# Patient Record
Sex: FEMALE | Race: WHITE | NOT HISPANIC OR LATINO | URBAN - METROPOLITAN AREA
[De-identification: names, ages, dates, MRNs, and addresses within clinical notes are randomized per-mention and may not be internally consistent; named-entity substitution may affect disease eponyms.]

---

## 2019-07-04 ENCOUNTER — HOSPITAL ENCOUNTER (OUTPATIENT)
Dept: URGENT CARE | Facility: CLINIC | Age: 64
Discharge: HOME | End: 2019-07-04

## 2024-09-16 ENCOUNTER — TELEPHONE (OUTPATIENT)
Dept: ORTHOPEDIC SURGERY | Facility: CLINIC | Age: 69
End: 2024-09-16
Payer: MEDICARE

## 2024-09-18 ENCOUNTER — OFFICE VISIT (OUTPATIENT)
Dept: ORTHOPEDIC SURGERY | Facility: CLINIC | Age: 69
End: 2024-09-18
Payer: MEDICARE

## 2024-09-18 VITALS
BODY MASS INDEX: 19.76 KG/M2 | DIASTOLIC BLOOD PRESSURE: 78 MMHG | HEIGHT: 59 IN | HEART RATE: 93 BPM | SYSTOLIC BLOOD PRESSURE: 148 MMHG | OXYGEN SATURATION: 93 % | WEIGHT: 98 LBS

## 2024-09-18 DIAGNOSIS — S52.501A CLOSED FRACTURE OF DISTAL END OF RIGHT RADIUS, UNSPECIFIED FRACTURE MORPHOLOGY, INITIAL ENCOUNTER: ICD-10-CM

## 2024-09-18 DIAGNOSIS — M25.531 RIGHT WRIST PAIN: Primary | ICD-10-CM

## 2024-09-25 ENCOUNTER — OFFICE VISIT (OUTPATIENT)
Dept: ORTHOPEDIC SURGERY | Facility: CLINIC | Age: 69
End: 2024-09-25
Payer: MEDICARE

## 2024-09-25 VITALS
SYSTOLIC BLOOD PRESSURE: 146 MMHG | WEIGHT: 98 LBS | HEART RATE: 82 BPM | HEIGHT: 59 IN | OXYGEN SATURATION: 95 % | BODY MASS INDEX: 19.76 KG/M2 | DIASTOLIC BLOOD PRESSURE: 77 MMHG

## 2024-09-25 DIAGNOSIS — M25.531 RIGHT WRIST PAIN: Primary | ICD-10-CM

## 2024-09-25 DIAGNOSIS — S52.501A CLOSED FRACTURE OF DISTAL END OF RIGHT RADIUS, UNSPECIFIED FRACTURE MORPHOLOGY, INITIAL ENCOUNTER: ICD-10-CM

## 2024-10-02 ENCOUNTER — OFFICE VISIT (OUTPATIENT)
Dept: ORTHOPEDIC SURGERY | Facility: CLINIC | Age: 69
End: 2024-10-02
Payer: MEDICARE

## 2024-10-02 VITALS
DIASTOLIC BLOOD PRESSURE: 81 MMHG | OXYGEN SATURATION: 96 % | HEIGHT: 59 IN | BODY MASS INDEX: 19.76 KG/M2 | WEIGHT: 98 LBS | HEART RATE: 74 BPM | SYSTOLIC BLOOD PRESSURE: 159 MMHG

## 2024-10-02 DIAGNOSIS — M25.531 RIGHT WRIST PAIN: Primary | ICD-10-CM

## 2024-10-02 DIAGNOSIS — S52.501A CLOSED FRACTURE OF DISTAL END OF RIGHT RADIUS, UNSPECIFIED FRACTURE MORPHOLOGY, INITIAL ENCOUNTER: ICD-10-CM

## 2024-10-02 NOTE — PROGRESS NOTES
"Chief Complaint  Follow-up and Pain of the Right Wrist    Subjective          Nicole Abraham presents to [facility] for a follow up for her right wrist.     History of Present Illness    The patient presents here today for a follow up for her right wrist. She has been treating her right wrist fracture conservatively in a short arm cast which was applied on 09/18/24. She presents today for her two week appointment. To review,  she fell in her yard and landed on her right wrist on 09/15/24. She denies any new injury or falls since her last visit.     No Known Allergies     Social History     Socioeconomic History    Marital status:    Tobacco Use    Smoking status: Never     Passive exposure: Never    Smokeless tobacco: Never   Vaping Use    Vaping status: Never Used   Substance and Sexual Activity    Alcohol use: Not Currently        I reviewed the patient's chief complaint, history of present illness, review of systems, past medical history, surgical history, family history, social history, medications, and allergy list.     REVIEW OF SYSTEMS    Constitutional: Denies fevers, chills, weight loss  Cardiovascular: Denies chest pain, shortness of breath  Skin: Denies rashes, acute skin changes  Neurologic: Denies headache, loss of consciousness  MSK: right wrist pain       Objective   Vital Signs:   /81   Pulse 74   Ht 149.9 cm (59\")   Wt 44.5 kg (98 lb)   SpO2 96%   BMI 19.79 kg/m²     Body mass index is 19.79 kg/m².    Physical Exam    General: Alert. No acute distress.   Right upper extremity:  short arm cast is clean, dry and intact, mild swelling and resolving bruising, no wounds or abrasions, able to wiggle her fingers, full elbow range of motion, neurovascularly intact, sensation intact to the medial , radial and ulnar nerve      Procedures    Imaging Results (Most Recent)       Procedure Component Value Units Date/Time    XR Wrist 2 View Right [[id number]] " Resulted: 10/02/24 0950     Updated: 10/02/24 0950    Narrative:      Indications: Follow-up right distal radius fracture    Views: AP and lateral right wrist    Findings: Stable appearing distal radius fracture with mild dorsal   angulation.  Cast material in place.  Degenerative changes in the hand and   wrist are stable.    Comparative Data: Comparative data found and reviewed today                     Assessment and Plan        XR Wrist 2 View Right    Result Date: 10/2/2024  Narrative: Indications: Follow-up right distal radius fracture Views: AP and lateral right wrist Findings: Stable appearing distal radius fracture with mild dorsal angulation.  Cast material in place.  Degenerative changes in the hand and wrist are stable. Comparative Data: Comparative data found and reviewed today    XR Wrist 2 View Right    Result Date: 9/25/2024  Narrative: Indications: Follow-up right distal radius fracture Views: AP and lateral right wrist Findings: Overall stable appearing fracture of the distal radius with mild radial and dorsal translation and angulation.  Cast material in place. Comparative Data: Comparative data found and reviewed today    XR Wrist 2 View Right    Result Date: 9/18/2024  Narrative: Indications: Follow-up right distal radius fracture Views: AP and lateral right wrist Findings: Right distal radius fracture again seen.  Intra-articular extension and mild dorsal angulation noted.  Radiocarpal joint remains reduced. Comparative Data: Comparative data found and reviewed today    XR Wrist 3+ View Right    Result Date: 9/15/2024  Narrative: XR HAND 3+ VW RIGHT, XR WRIST 3+ VW RIGHT Date of Exam: 9/15/2024 4:19 PM EDT Indication: Fall; right hand and wrist pain Comparison: None available. Findings: There is an impacted fracture involving the distal radius. There is mild dorsal angulation of the distal fracture fragment. The articulations of the wrist remain intact without dislocation. There is no  additional displaced fracture throughout the remaining bones of the right hand. There is no dislocation. Mild to moderate changes of osteoarthritis are noted, most pronounced at the first carpal metacarpal joint.     Impression: Impression: 1.Transverse impacted fracture involving the distal right radius. There is dorsal angulation of the distal fracture fragment. The articulations of the wrist remain intact without dislocation. Electronically Signed: Porfirio Malave MD  9/15/2024 4:43 PM EDT  Workstation ID: [id number]    XR Hand 3+ View Right    Result Date: 9/15/2024  Narrative: XR HAND 3+ VW RIGHT, XR WRIST 3+ VW RIGHT Date of Exam: 9/15/2024 4:19 PM EDT Indication: Fall; right hand and wrist pain Comparison: None available. Findings: There is an impacted fracture involving the distal radius. There is mild dorsal angulation of the distal fracture fragment. The articulations of the wrist remain intact without dislocation. There is no additional displaced fracture throughout the remaining bones of the right hand. There is no dislocation. Mild to moderate changes of osteoarthritis are noted, most pronounced at the first carpal metacarpal joint.     Impression: Impression: 1.Transverse impacted fracture involving the distal right radius. There is dorsal angulation of the distal fracture fragment. The articulations of the wrist remain intact without dislocation. Electronically Signed: Porfirio Malave MD  9/15/2024 4:43 PM EDT  Workstation ID: [id number]      Diagnoses and all orders for this visit:    1. Right wrist pain (Primary)  -     XR Wrist 2 View Right    2. Closed fracture of distal end of right radius, unspecified fracture morphology, initial encounter      The patient presents here today for a follow up for her right wrist. X-rays were obtained in the office today and these were reviewed today.     She will continue the short arm cast. Advised she will be in the cast for six weeks total. She will continue to  elevate her wrist for swelling.     Will obtain X-Rays of right wrist in the cast at next visit.     Call or return if worsening symptoms.    Scribed for Roderick Cummings MD by Nahomi Robles  10/02/2024   08:35 EDT         Follow Up       2 weeks     Patient was given instructions and counseling regarding her condition or for health maintenance advice. Please see specific information pulled into the AVS if appropriate.       I have personally performed the services described in this document as scribed by the above individual and it is both accurate and complete. Roderick Cummings MD 10/02/24 11:37 EDT

## 2024-10-16 ENCOUNTER — OFFICE VISIT (OUTPATIENT)
Dept: ORTHOPEDIC SURGERY | Facility: CLINIC | Age: 69
End: 2024-10-16
Payer: MEDICARE

## 2024-10-16 VITALS
WEIGHT: 98 LBS | BODY MASS INDEX: 19.76 KG/M2 | HEIGHT: 59 IN | OXYGEN SATURATION: 96 % | HEART RATE: 89 BPM | SYSTOLIC BLOOD PRESSURE: 146 MMHG | DIASTOLIC BLOOD PRESSURE: 85 MMHG

## 2024-10-16 DIAGNOSIS — S52.501D CLOSED FRACTURE OF DISTAL END OF RIGHT RADIUS WITH ROUTINE HEALING, UNSPECIFIED FRACTURE MORPHOLOGY, SUBSEQUENT ENCOUNTER: ICD-10-CM

## 2024-10-16 DIAGNOSIS — M25.531 RIGHT WRIST PAIN: Primary | ICD-10-CM

## 2024-10-16 NOTE — PROGRESS NOTES
"Chief Complaint  Pain and Follow-up of the Right Wrist    Subjective          Nicole Abraham presents to [facility] for a follow up for her right wrist.     History of Present Illness    The patient presents here today for a follow up for her right wrist. She has been treating her right wrist fracture conservatively in a short arm cast which was applied on 09/18/24. She reports no new injury or falls. To review,  she fell in her yard and landed on her right wrist on 09/15/24. She denies any new injury or falls since her last visit.     No Known Allergies     Social History     Socioeconomic History    Marital status:    Tobacco Use    Smoking status: Never     Passive exposure: Never    Smokeless tobacco: Never   Vaping Use    Vaping status: Never Used   Substance and Sexual Activity    Alcohol use: Not Currently        I reviewed the patient's chief complaint, history of present illness, review of systems, past medical history, surgical history, family history, social history, medications, and allergy list.     REVIEW OF SYSTEMS    Constitutional: Denies fevers, chills, weight loss  Cardiovascular: Denies chest pain, shortness of breath  Skin: Denies rashes, acute skin changes  Neurologic: Denies headache, loss of consciousness  MSK: right wrist pain       Objective   Vital Signs:   /85   Pulse 89   Ht 149.9 cm (59\")   Wt 44.5 kg (98 lb)   SpO2 96%   BMI 19.79 kg/m²     Body mass index is 19.79 kg/m².    Physical Exam    General: Alert. No acute distress.   Right upper extremity: short arm cast is clean, dry and intact, mild swelling to her fingers, no wounds or abrasions, resolving bruising, able to wiggle her fingers, neurovascularly intact     Orthopedic Injury Treatment    Date/Time: 10/16/2024 9:15 AM    Performed by: Teresa Morillo MA  Authorized by: Roderick Cummings MD  Injury location: wrist  Location details: right wrist  Pre-procedure neurovascular " assessment: neurovascularly intact    Anesthesia:  Local anesthesia used: no    Sedation:  Patient sedated: no    Immobilization: cast  Supplies used: cotton padding (FIBERGLASS)  Post-procedure neurovascular assessment: post-procedure neurovascularly intact  Patient tolerance: patient tolerated the procedure well with no immediate complications  Comments: Patient was placed in fiberglass cast today.  The patient tolerated the procedure without any complications.          Imaging Results (Most Recent)       Procedure Component Value Units Date/Time    XR Wrist 2 View Right [[id number]] Resulted: 10/16/24 0927     Updated: 10/16/24 0927    Narrative:      Indications: Follow-up right distal radius fracture    Views: AP and lateral right wrist    Findings: Extra-articular fracture of the distal radius remains stable   with mild radial and dorsal displacement.  Arthritic changes in the hand   are stable.  Cast material in place.    Comparative Data: Comparative data found and reviewed today                     Assessment and Plan        XR Wrist 2 View Right    Result Date: 10/16/2024  Narrative: Indications: Follow-up right distal radius fracture Views: AP and lateral right wrist Findings: Extra-articular fracture of the distal radius remains stable with mild radial and dorsal displacement.  Arthritic changes in the hand are stable.  Cast material in place. Comparative Data: Comparative data found and reviewed today    XR Wrist 2 View Right    Result Date: 10/2/2024  Narrative: Indications: Follow-up right distal radius fracture Views: AP and lateral right wrist Findings: Stable appearing distal radius fracture with mild dorsal angulation.  Cast material in place.  Degenerative changes in the hand and wrist are stable. Comparative Data: Comparative data found and reviewed today    XR Wrist 2 View Right    Result Date: 9/25/2024  Narrative: Indications: Follow-up right distal radius fracture Views: AP and lateral right  wrist Findings: Overall stable appearing fracture of the distal radius with mild radial and dorsal translation and angulation.  Cast material in place. Comparative Data: Comparative data found and reviewed today    XR Wrist 2 View Right    Result Date: 9/18/2024  Narrative: Indications: Follow-up right distal radius fracture Views: AP and lateral right wrist Findings: Right distal radius fracture again seen.  Intra-articular extension and mild dorsal angulation noted.  Radiocarpal joint remains reduced. Comparative Data: Comparative data found and reviewed today      Diagnoses and all orders for this visit:    1. Right wrist pain (Primary)  -     XR Wrist 2 View Right    2. Closed fracture of distal end of right radius with routine healing, unspecified fracture morphology, subsequent encounter    Other orders  -     Orthopedic Injury Treatment      The patient presents here today for a follow up for her right wrist. X-rays were obtained in the office today and these were reviewed today.     Her short arm cast was removed and reapplied today due to her cast being loose. She will continue the short arm cast for another two weeks.     Advised patient to continue to elevate to help with swelling and to work on range of motion to her fingers. She will continue over the counter medications for pain control.     Will obtain X-Rays of right wrist out of the cast at next visit.     Call or return if worsening symptoms.    Scribed for Roderick Cummings MD by Nahomi Robles  10/16/2024   09:10 EDT         Follow Up       return in 2 weeks for follow up fracture care/ management.       Patient was given instructions and counseling regarding her condition or for health maintenance advice. Please see specific information pulled into the AVS if appropriate.         I have personally performed the services described in this document as scribed by the above individual and it is both accurate and complete. Roderick Cummings MD 10/16/24 10:17  EDT    Answers submitted by the patient for this visit:  Other (Submitted on 10/14/2024)  Please describe your symptoms.: Follow up on broken right wrist  Have you had these symptoms before?: Yes  How long have you been having these symptoms?: Greater than 2 weeks  Please list any medications you are currently taking for this condition.: Tylenol  Please describe any probable cause for these symptoms. : Broken right wrist  Primary Reason for Visit (Submitted on 10/14/2024)  What is the primary reason for your visit?: Problem Not Listed

## 2024-10-30 ENCOUNTER — OFFICE VISIT (OUTPATIENT)
Dept: ORTHOPEDIC SURGERY | Facility: CLINIC | Age: 69
End: 2024-10-30
Payer: MEDICARE

## 2024-10-30 VITALS
OXYGEN SATURATION: 97 % | HEIGHT: 59 IN | BODY MASS INDEX: 19.76 KG/M2 | DIASTOLIC BLOOD PRESSURE: 75 MMHG | HEART RATE: 92 BPM | WEIGHT: 98 LBS | SYSTOLIC BLOOD PRESSURE: 139 MMHG

## 2024-10-30 DIAGNOSIS — M25.531 RIGHT WRIST PAIN: Primary | ICD-10-CM

## 2024-10-30 DIAGNOSIS — S52.501D CLOSED FRACTURE OF DISTAL END OF RIGHT RADIUS WITH ROUTINE HEALING, UNSPECIFIED FRACTURE MORPHOLOGY, SUBSEQUENT ENCOUNTER: ICD-10-CM

## 2024-10-30 NOTE — PROGRESS NOTES
"Chief Complaint  Follow-up and Pain of the Right Wrist    Subjective          Nicole Abraham presents to [facility] for a follow up for her right wrist.     History of Present Illness    The patient presents here today for a follow up for her right wrist. She has been treating her right wrist fracture conservatively in a short arm cast which was applied on 09/18/24. Her short arm cast was removed today in the office. She reports no new injury or falls since her last visit. She initially injured her wrist on 09/15/24 when she fell in her yard and landed on her right wrist.      No Known Allergies     Social History     Socioeconomic History    Marital status:    Tobacco Use    Smoking status: Never     Passive exposure: Never    Smokeless tobacco: Never   Vaping Use    Vaping status: Never Used   Substance and Sexual Activity    Alcohol use: Not Currently        I reviewed the patient's chief complaint, history of present illness, review of systems, past medical history, surgical history, family history, social history, medications, and allergy list.     REVIEW OF SYSTEMS    Constitutional: Denies fevers, chills, weight loss  Cardiovascular: Denies chest pain, shortness of breath  Skin: Denies rashes, acute skin changes  Neurologic: Denies headache, loss of consciousness  MSK: Right wrist pain       Objective   Vital Signs:   /75   Pulse 92   Ht 149.9 cm (59\")   Wt 44.5 kg (98 lb)   SpO2 97%   BMI 19.79 kg/m²     Body mass index is 19.79 kg/m².    Physical Exam    General: Alert. No acute distress.   Right upper extremity: short arm cast was removed today in the office, stiffness with flexion and extension, stiffness with finger flexion, tenderness to the fracture site, neurovascularly intact, mild swelling, positive  pulses, sensation intact.      Procedures    Imaging Results (Most Recent)       Procedure Component Value Units Date/Time    XR Wrist 2 View Right " [[id number]] Resulted: 10/30/24 1056     Updated: 10/30/24 1056    Narrative:      Indications: Follow-up right distal radius fracture    Views: AP and lateral right wrist    Findings: Extra-articular distal radius fracture again seen.  Callus   formation along the fracture line.  Mild dorsal angulation appears stable.    Degenerative changes in the wrist/hand are unchanged.    Comparative Data: Comparative data found and reviewed today                Assessment and Plan        XR Wrist 2 View Right    Result Date: 10/30/2024  Narrative: Indications: Follow-up right distal radius fracture Views: AP and lateral right wrist Findings: Extra-articular distal radius fracture again seen.  Callus formation along the fracture line.  Mild dorsal angulation appears stable.  Degenerative changes in the wrist/hand are unchanged. Comparative Data: Comparative data found and reviewed today    XR Wrist 2 View Right    Result Date: 10/16/2024  Narrative: Indications: Follow-up right distal radius fracture Views: AP and lateral right wrist Findings: Extra-articular fracture of the distal radius remains stable with mild radial and dorsal displacement.  Arthritic changes in the hand are stable.  Cast material in place. Comparative Data: Comparative data found and reviewed today    XR Wrist 2 View Right    Result Date: 10/2/2024  Narrative: Indications: Follow-up right distal radius fracture Views: AP and lateral right wrist Findings: Stable appearing distal radius fracture with mild dorsal angulation.  Cast material in place.  Degenerative changes in the hand and wrist are stable. Comparative Data: Comparative data found and reviewed today      Diagnoses and all orders for this visit:    1. Right wrist pain (Primary)  -     XR Wrist 2 View Right    2. Closed fracture of distal end of right radius with routine healing, unspecified fracture morphology, subsequent encounter  -     Ambulatory Referral to Occupational Therapy for Evaluation  & Treatment      The patient presents here today for a follow up for her right wrist. X-rays were obtained in the office today and these were reviewed today.     Her short arm cast was removed today in the office. She will be placed in a comfort form brace today.     Order placed today for occupational therapy and advised patient not to lift more than 5 pounds with this hand.        Will obtain X-Rays of right wrist at next visit.     Call or return if worsening symptoms.    Scribed for Roderick Cummings MD by Nahomi Robles  10/30/2024   10:07 EDT         Follow Up     3 weeks     Patient was given instructions and counseling regarding her condition or for health maintenance advice. Please see specific information pulled into the AVS if appropriate.         I have personally performed the services described in this document as scribed by the above individual and it is both accurate and complete. Roderick Cummings MD 10/30/24 11:00 EDT

## 2024-11-11 ENCOUNTER — TREATMENT (OUTPATIENT)
Dept: PHYSICAL THERAPY | Facility: CLINIC | Age: 69
End: 2024-11-11
Payer: MEDICARE

## 2024-11-11 DIAGNOSIS — S52.501A CLOSED FRACTURE OF DISTAL END OF RIGHT RADIUS, UNSPECIFIED FRACTURE MORPHOLOGY, INITIAL ENCOUNTER: Primary | ICD-10-CM

## 2024-11-11 DIAGNOSIS — M25.631 STIFFNESS OF RIGHT WRIST JOINT: ICD-10-CM

## 2024-11-11 DIAGNOSIS — M25.641 STIFFNESS OF RIGHT HAND JOINT: ICD-10-CM

## 2024-11-11 DIAGNOSIS — M25.531 RIGHT WRIST PAIN: ICD-10-CM

## 2024-11-11 PROCEDURE — 97165 OT EVAL LOW COMPLEX 30 MIN: CPT | Performed by: OCCUPATIONAL THERAPIST

## 2024-11-11 PROCEDURE — 97110 THERAPEUTIC EXERCISES: CPT | Performed by: OCCUPATIONAL THERAPIST

## 2024-11-11 NOTE — PROGRESS NOTES
"Occupational Therapy Initial Evaluation and Plan of Care  [place]  OT: [address] [facility] [place] [address]    Patient: Nicole Abraham   : 1955  Diagnosis/ICD-10 Code:  Closed fracture of distal end of right radius, unspecified fracture morphology, initial encounter [S52.501A]  Referring practitioner: Roderick Cummings MD  Date of Initial Visit: 2024  Today's Date: 2024  Patient seen for 1 sessions           Subjective Questionnaire: QuickDASH: 33/55      Subjective Evaluation    History of Present Illness  Date of onset: 2024  Mechanism of injury: Pt is a RHD 68 y/o female who presents to therapy with c/o R wrist pain, swelling and stiffness. Pt reports on 24 she tripped over a drainage pipe in her yard landing on her R hand. Pt went to  the next day where Xrays revealed: 1.Transverse impacted fracture involving the distal right radius. There is dorsal angulation of the distal fracture fragment. The articulations of the wrist remain intact without dislocation. Pt was casted on  and this was removed on 10/30. Pt presents in a prefab wrist brace. Pt is retired.  PMHx: cervical cancer ().        Precautions and Work Restrictions: 5#Pain  Current pain ratin  At best pain ratin  At worst pain ratin  Location: R wrist  Quality: sharp  Relieving factors: change in position, support, ice and medications  Exacerbated by: twisting.  Progression: improved    Social Support  Lives with: spouse    Hand dominance: right    Diagnostic Tests  X-ray: abnormal    Treatments  Previous treatment: immobilization  Patient Goals  Patient goals for therapy: decreased edema, decreased pain, increased motion, increased strength, independence with ADLs/IADLs and return to sport/leisure activities  Patient goal: \"Be able to use my hand and arm\"           Objective          Observations     Additional Wrist/Hand Observation Details  Pt presents in prefab wrist brace. Swelling visible in R " digits/hand/wrist.    Neurological Testing     Additional Neurological Details  Pt reports no numbness/tingling.    Active Range of Motion     Right Elbow   Flexion: WFL  Extension: WFL  Forearm supination: 20 degrees with pain  Forearm pronation: WFL    Left Wrist   Wrist flexion: 95 degrees   Wrist extension: 75 degrees   Radial deviation: 25 degrees   Ulnar deviation: 35 degrees     Right Wrist   Wrist flexion: 45 degrees with pain  Wrist extension: 5 degrees   Radial deviation: 0 degrees   Ulnar deviation: 10 degrees     Right Thumb   Opposition: Pt able to oppose to radial side of RF.    Additional Active Range of Motion Details  Pt reports stiffness in R elbow with extension.  6 cm at MF from [facility].    Strength/Myotome Testing     Left Wrist/Hand      (2nd hand position)     Trial 1: 25 lbs    Trial 2: 20 lbs    Trial 3: 19 lbs    Average: 21.33 lbs    Swelling     Left Wrist/Hand   Thumb     Proximal: 6.25 cm    Distal: 6 cm  Index     Proximal: 6.25 cm    Middle: 5.5 cm    Distal: 5 cm  Middle     Proximal: 6 cm    Middle: 5.5 cm    Distal: 5 cm  Ring     Proximal: 5.5 cm    Middle: 4.5 cm    Distal: 4.5 cm  Little     Proximal: 5 cm    Middle: 4.5 cm    Distal: 4.5 cm  Circumference MCP: 17.5 cm  Circumference wrist: 15.25 cm    Right Wrist/Hand   Thumb     Proximal: 7.5 cm    Distal: 7 cm  Index     Proximal: 7.5 cm    Middle: 6.5 cm    Distal: 5.5 cm  Middle     Proximal: 7.5 cm    Middle: 6.5 cm    Distal: 5.25 cm  Ring     Proximal: 7 cm    Middle: 5.75 cm    Distal: 5 cm  Little     Proximal: 5.75 cm    Middle: 5 cm    Distal: 4.5 cm  Circumference MCP: 19 cm  Circumference wrist: 17 cm          Assessment & Plan       Assessment  Impairments: abnormal or restricted ROM, activity intolerance, impaired physical strength, lacks appropriate home exercise program and pain with function   Functional limitations: carrying objects, lifting, sleeping, pulling, pushing and uncomfortable because of pain    Assessment details: Pt will benefit from skilled OT secondary to decreased strength/ROM and increased pain and swelling that limits pt ability to complete ADLs.  Prognosis: good    Goals  Plan Goals: The patient complains of pain in the R wrist.  LTG 1  12 weeks:  The patient will report a pain rating of 1/10 at worst in order to improve sleep quality and tolerance to performance of activities of daily living.   Status: New  STG 1  8 weeks:  The patient will report a pain rating of 3/10 at worst.  Status:  New    2.  The patient has limited ROM of the R wrist/hand.  LTG 2  12 weeks:  The patient will demonstrate 80 degrees R wrist flexion, 70 degrees extension, 30 degrees ulnar deviation, 20 degrees radial deviation and ability to make composite fist to improve ability to carry and manipulate objects.  Status: New  STG 2  8 weeks:  The patient will demonstrate 60 degrees of R wrist flexion, 30 degrees wrist extension, 20 degrees ulnar deviation, 10 degrees radial deviation, and 3 cm from [facility].  Status:  New    3.  The patient has limited strength of the R hand/wrist.  LTG 3  12 weeks:  The patient will demonstrate 25 lbs of  strength and 5/5 wrist strength in order to grasp and manipulate objects.  Status: New  STG 3  8 weeks:  The patient will demonstrate ability to tolerate MMT and  strength testing of R hand/wrist.  Status:  New    4.  Carrying, moving, and handling objects functional limitation.  LTG 4  12 weeks:  The patient will demonstrate 1-19 % limitation by achieving a score of 15/55 on the Quick DASH.  Status: New  STG 4  8 weeks:  The patient will demonstrate 20-39 % limitation by achieving a score of 27/55 on the Quick DASH.  Status:  New       Plan  Planned modality interventions: cryotherapy and thermotherapy (hydrocollator packs)  Planned therapy interventions: body mechanics training, fine motor coordination training, flexibility, functional ROM exercises, home exercise program, joint  mobilization, manual therapy, neuromuscular re-education, postural training, soft tissue mobilization, strengthening, stretching and therapeutic activities  Frequency: 2x week  Duration in weeks: 12  Treatment plan discussed with: patient      Pt is indicated for skilled occupational therapy services.  Timed:           Therapeutic Exercise:    10     mins  65989;       Un-Timed:  Low Eval     34     Mins  20919      Timed Treatment:   10   mins   Total Treatment:     44   mins    Start time: 0932  End time: 1016    OT SIGNATURE: Armand Lowry, FAREED   DATE TREATMENT INITIATED: 11/11/2024  [place] License: [id number]    Initial Certification  Certification Period: 2/8/2025  I certify that the therapy services are furnished while this patient is under my care.  The services outlined above are required by this patient, and will be reviewed every 90 days.     PHYSICIAN: Roderick Cummings MD      DATE:   MD NPI: [id number]  Please sign and return via fax to   [phone].. Thank you, [facility].

## 2024-11-13 ENCOUNTER — TREATMENT (OUTPATIENT)
Dept: PHYSICAL THERAPY | Facility: CLINIC | Age: 69
End: 2024-11-13
Payer: MEDICARE

## 2024-11-13 DIAGNOSIS — M25.631 STIFFNESS OF RIGHT WRIST JOINT: ICD-10-CM

## 2024-11-13 DIAGNOSIS — M25.531 RIGHT WRIST PAIN: ICD-10-CM

## 2024-11-13 DIAGNOSIS — M25.641 STIFFNESS OF RIGHT HAND JOINT: ICD-10-CM

## 2024-11-13 DIAGNOSIS — S52.501A CLOSED FRACTURE OF DISTAL END OF RIGHT RADIUS, UNSPECIFIED FRACTURE MORPHOLOGY, INITIAL ENCOUNTER: Primary | ICD-10-CM

## 2024-11-13 NOTE — PROGRESS NOTES
"Occupational Therapy Daily Treatment Note  [place] OT: [address] [place] [address]      Patient: Nicole Abraham   : 1955  Diagnosis/ICD-10 Code:  Closed fracture of distal end of right radius, unspecified fracture morphology, initial encounter [S52.501A]  Referring practitioner: Roderick Cummings MD  Date of Initial Visit: Type: THERAPY  Noted: 2024  Today's Date: 2024  Patient seen for 2 sessions           Subjective   Nicole Abraham reports: No current pain.    Objective     See Exercise, Manual, and Modality Logs for complete treatment.       Assessment/Plan  Attempted wrist maze, active pro/sup with green flexbar, and wrist disc but pt reports poor tolerance (ended). OT graded up reps with flx/ext. Pt reports \"my bone is shifting\" with with any pro/sup. MD notified. OT provided active wrist flexion/extension and RD/UD HEP. Teachback successful. Pt continues with decreased fxl strength/ROM that limit ability to complete ADLs/IADLs.  Progress per Plan of Care           Timed:  Therapeutic Exercise:    15     mins  11384;     Therapeutic Activity:     10     mins  42364;       Timed Treatment:   25   mins   Total Treatment:     25   mins    Start time: 1130  End time: 1155    Armand Lowry OT  Occupational Therapist  [place] License: [id number]  NPI: [id number]  "

## 2024-11-19 ENCOUNTER — TREATMENT (OUTPATIENT)
Dept: PHYSICAL THERAPY | Facility: CLINIC | Age: 69
End: 2024-11-19
Payer: MEDICARE

## 2024-11-19 DIAGNOSIS — S52.501A CLOSED FRACTURE OF DISTAL END OF RIGHT RADIUS, UNSPECIFIED FRACTURE MORPHOLOGY, INITIAL ENCOUNTER: Primary | ICD-10-CM

## 2024-11-19 DIAGNOSIS — M25.531 RIGHT WRIST PAIN: ICD-10-CM

## 2024-11-19 DIAGNOSIS — M25.631 STIFFNESS OF RIGHT WRIST JOINT: ICD-10-CM

## 2024-11-19 DIAGNOSIS — M25.641 STIFFNESS OF RIGHT HAND JOINT: ICD-10-CM

## 2024-11-19 NOTE — PROGRESS NOTES
"Occupational Therapy Daily Treatment Note  [place] OT: [address] [place] [address]      Patient: Nicole Abraham   : 1955  Diagnosis/ICD-10 Code:  Closed fracture of distal end of right radius, unspecified fracture morphology, initial encounter [S52.501A]  Referring practitioner: Roderick Cummings MD  Date of Initial Visit: Type: THERAPY  Noted: 2024  Today's Date: 2024  Patient seen for 3 sessions           Subjective   Nicole Abraham reports: No current pain. Pt reports improved mobility in hand/fingers.    Objective     See Exercise, Manual, and Modality Logs for complete treatment.       Assessment/Plan  Good tolerance to treatment. OT focused on exercises that avoided the \"shifting\" motion at the DRUJ. Pt to return to MD for follow up. Pt continues with decreased fxl strength/ROM that limit ability to complete ADLs/IADLs.  Progress per Plan of Care           Timed:  Therapeutic Exercise:    26     mins  15649;        Timed Treatment:   26   mins   Total Treatment:     26   mins    Start time: 1001  End time: 1027    Armand Lowry OT  Occupational Therapist  [place] License: [id number]  NPI: [id number]  "

## 2024-11-20 ENCOUNTER — OFFICE VISIT (OUTPATIENT)
Dept: ORTHOPEDIC SURGERY | Facility: CLINIC | Age: 69
End: 2024-11-20
Payer: MEDICARE

## 2024-11-20 VITALS — SYSTOLIC BLOOD PRESSURE: 135 MMHG | OXYGEN SATURATION: 98 % | HEART RATE: 90 BPM | DIASTOLIC BLOOD PRESSURE: 81 MMHG

## 2024-11-20 DIAGNOSIS — M25.531 RIGHT WRIST PAIN: ICD-10-CM

## 2024-11-20 DIAGNOSIS — S52.501D CLOSED FRACTURE OF DISTAL END OF RIGHT RADIUS WITH ROUTINE HEALING, UNSPECIFIED FRACTURE MORPHOLOGY, SUBSEQUENT ENCOUNTER: Primary | ICD-10-CM

## 2024-11-20 NOTE — PROGRESS NOTES
Chief Complaint  Follow-up of the Right Wrist    Subjective          Nicole Abraham presents to [facility]   History of Present Illness    Nicole Abraham presents today for a follow-up of right wrist.  Patient has a right distal radius fracture that we are treating conservatively with initial injury 9/15/2024.  Today, patient states that she is doing okay.  She has attended 3 physical therapy sessions and reports improvements.  She describes swelling to her fingers.  She has been wearing her brace.  She explains that she and her physical therapist have noted shifting to her wrist with certain range of motion exercises.      No Known Allergies     Social History     Socioeconomic History    Marital status:    Tobacco Use    Smoking status: Never     Passive exposure: Never    Smokeless tobacco: Never   Vaping Use    Vaping status: Never Used   Substance and Sexual Activity    Alcohol use: Not Currently        I reviewed the patient's chief complaint, history of present illness, review of systems, past medical history, surgical history, family history, social history, medications, and allergy list.     REVIEW OF SYSTEMS    Constitutional: Denies fevers, chills, weight loss  Cardiovascular: Denies chest pain, shortness of breath  Skin: Denies rashes, acute skin changes  Neurologic: Denies headache, loss of consciousness  MSK: Right wrist pain      Objective   Vital Signs:   /81   Pulse 90   SpO2 98%     There is no height or weight on file to calculate BMI.    Physical Exam    General: Alert. No acute distress.   Right upper extremity: Mild swelling to the hand and fingers.  Forearm soft.  Demonstrates active wrist flexion and extension with associated stiffness.  Wrist flexion 5.  Wrist extension 10.  Unable to obtain full finger extension due to swelling.  Unable to get fingers to the palm due to swelling.  Swelling prohibits thumb opposition.  Sensation intact to  median, radial, and ulnar nerve distributions.  Palpable radial pulse.    Procedures    Imaging Results (Most Recent)       Procedure Component Value Units Date/Time    XR Wrist 2 View Right [[id number]] Resulted: 11/20/24 1120     Updated: 11/20/24 1120    Narrative:      Indications: Follow-up right wrist fracture    Views: AP and lateral right wrist    Findings: Right distal radius fracture is seen with stable alignment   compared to previous films.  Callus is forming along the fracture site.    Mild dorsal angulation appears stable.  Degenerative changes are present   to the hand and wrist.    Comparative Data: Comparative data found and reviewed today.                   Assessment and Plan    Diagnoses and all orders for this visit:    1. Closed fracture of distal end of right radius with routine healing, unspecified fracture morphology, subsequent encounter (Primary)  -     diclofenac (VOLTAREN) 50 MG EC tablet; Take 1 tablet by mouth 2 (Two) Times a Day.  Dispense: 60 tablet; Refill: 0    2. Right wrist pain  -     XR Wrist 2 View Right        Nicole Abraham presents today to follow-up with her right distal radius fracture that we are treating conservatively with initial injury 9/15/2024.  X-rays reviewed with the patient today.  Patient instructed to continue with formal physical therapy as well as her home exercises.  Continue with range of motion exercises at this time.  Avoid lifting, pushing or pulling with the right upper extremity.  Continue with wrist brace for activity.  Use ice and elevation to help with inflammation.  Diclofenac was ordered today.  Patient instructed not to take any other anti-inflammatories with this medication.    Patient will follow up in 3 weeks for reevaluation.  We will obtain new x-rays of the right wrist at next visit.      Call or return if symptoms worsen or patient has any concerns.       Follow Up   Return in about 3 weeks (around 12/11/2024).  Patient was given  instructions and counseling regarding her condition or for health maintenance advice. Please see specific information pulled into the AVS if appropriate.     Samina Taveras PA-C  11/20/24  13:15 EST

## 2024-11-22 ENCOUNTER — TREATMENT (OUTPATIENT)
Dept: PHYSICAL THERAPY | Facility: CLINIC | Age: 69
End: 2024-11-22
Payer: MEDICARE

## 2024-11-22 DIAGNOSIS — M25.531 RIGHT WRIST PAIN: ICD-10-CM

## 2024-11-22 DIAGNOSIS — M25.641 STIFFNESS OF RIGHT HAND JOINT: ICD-10-CM

## 2024-11-22 DIAGNOSIS — M25.631 STIFFNESS OF RIGHT WRIST JOINT: ICD-10-CM

## 2024-11-22 DIAGNOSIS — S52.501A CLOSED FRACTURE OF DISTAL END OF RIGHT RADIUS, UNSPECIFIED FRACTURE MORPHOLOGY, INITIAL ENCOUNTER: Primary | ICD-10-CM

## 2024-11-22 NOTE — PROGRESS NOTES
Occupational Therapy Daily Treatment Note  [place] OT: [address] [place] [address]      Patient: Nicole Abraham   : 1955  Diagnosis/ICD-10 Code:  Closed fracture of distal end of right radius, unspecified fracture morphology, initial encounter [S52.501A]  Referring practitioner: Roderick Cummings MD  Date of Initial Visit: Type: THERAPY  Noted: 2024  Today's Date: 2024  Patient seen for 4 sessions           Subjective   Nicole Abraham reports: No current pain.    Objective     See Exercise, Manual, and Modality Logs for complete treatment.       Assessment/Plan  Good tolerance to treatment. Pt displays improved digit ROM. Discussed adding table top towel grasp into HEP. Pt verbalizes understanding and teachback successful. Pt continues with decreased fxl strength/ROM that limit ability to complete ADLs/IADLs.  Progress per Plan of Care           Timed:  Manual Therapy:            8     mins  51782;   Therapeutic Exercise:    20     mins  83770;     Therapeutic Activity:     10     mins  44689;       Timed Treatment:   38   mins   Total Treatment:     38   mins    Start time: 0855  End time: 933    Armand Lowry OT  Occupational Therapist  [place] License: [id number]  NPI: [id number]

## 2024-11-26 ENCOUNTER — TREATMENT (OUTPATIENT)
Dept: PHYSICAL THERAPY | Facility: CLINIC | Age: 69
End: 2024-11-26
Payer: MEDICARE

## 2024-11-26 DIAGNOSIS — M25.631 STIFFNESS OF RIGHT WRIST JOINT: ICD-10-CM

## 2024-11-26 DIAGNOSIS — S52.501A CLOSED FRACTURE OF DISTAL END OF RIGHT RADIUS, UNSPECIFIED FRACTURE MORPHOLOGY, INITIAL ENCOUNTER: Primary | ICD-10-CM

## 2024-11-26 DIAGNOSIS — M25.641 STIFFNESS OF RIGHT HAND JOINT: ICD-10-CM

## 2024-11-26 DIAGNOSIS — M25.531 RIGHT WRIST PAIN: ICD-10-CM

## 2024-11-26 NOTE — PROGRESS NOTES
Occupational Therapy Daily Treatment Note  [place] OT: [address] [place] [address]      Patient: Nicole Abraham   : 1955  Diagnosis/ICD-10 Code:  Closed fracture of distal end of right radius, unspecified fracture morphology, initial encounter [S52.501A]  Referring practitioner: Roderick Cummings MD  Date of Initial Visit: Type: THERAPY  Noted: 2024  Today's Date: 2024  Patient seen for 5 sessions           Subjective   Nicole Abraham reports: No current pain. Pt reports continued shifting of radius and ulna.    Objective     See Exercise, Manual, and Modality Logs for complete treatment.       Assessment/Plan  Good tolerance to treatment this date. Pt continues with decreased fxl strength/ROM that limit ability to complete ADLs/IADLs.  Progress per Plan of Care           Timed:  Therapeutic Exercise:    20     mins  16719;       Therapeutic Activity:     10     mins  44711;       Timed Treatment:   30   mins   Total Treatment:     30   mins    Start time: 1000  End time: 1030    Armand Lowry OT  Occupational Therapist  [place] License: [id number]  NPI: [id number]

## 2024-12-03 ENCOUNTER — TREATMENT (OUTPATIENT)
Dept: PHYSICAL THERAPY | Facility: CLINIC | Age: 69
End: 2024-12-03
Payer: MEDICARE

## 2024-12-03 DIAGNOSIS — M25.631 STIFFNESS OF RIGHT WRIST JOINT: ICD-10-CM

## 2024-12-03 DIAGNOSIS — M25.531 RIGHT WRIST PAIN: ICD-10-CM

## 2024-12-03 DIAGNOSIS — S52.501A CLOSED FRACTURE OF DISTAL END OF RIGHT RADIUS, UNSPECIFIED FRACTURE MORPHOLOGY, INITIAL ENCOUNTER: Primary | ICD-10-CM

## 2024-12-03 DIAGNOSIS — M25.641 STIFFNESS OF RIGHT HAND JOINT: ICD-10-CM

## 2024-12-03 PROCEDURE — 97110 THERAPEUTIC EXERCISES: CPT | Performed by: OCCUPATIONAL THERAPIST

## 2024-12-03 PROCEDURE — 97530 THERAPEUTIC ACTIVITIES: CPT | Performed by: OCCUPATIONAL THERAPIST

## 2024-12-03 NOTE — PROGRESS NOTES
Occupational Therapy Daily Treatment Note  [place] OT: [address] [place] [address]      Patient: Nicole Abraham   : 1955  Diagnosis/ICD-10 Code:  Closed fracture of distal end of right radius, unspecified fracture morphology, initial encounter [S52.501A]  Referring practitioner: Roderick Cummings MD  Date of Initial Visit: Type: THERAPY  Noted: 2024  Today's Date: 12/3/2024  Patient seen for 6 sessions           Subjective   Nicole Abraham reports: No current pain. Pt reports less shifting in the wrist.    Objective     See Exercise, Manual, and Modality Logs for complete treatment.       Assessment/Plan  Ext drill adapted to palm off table secondary to shifting at [facility]. Good tolerance to treatment this date. Pt continues with decreased fxl strength/ROM that limit ability to complete ADLs/IADLs.  Progress per Plan of Care           Timed:  Therapeutic Exercise:    10     mins  92093;     Therapeutic Activity:     13     mins  27833;       Timed Treatment:   23   mins   Total Treatment:     30   mins    Start time: 1000  End time: 1030    Armand Lowry OT  Occupational Therapist  [place] License: [id number]  NPI: [id number]

## 2024-12-06 ENCOUNTER — TREATMENT (OUTPATIENT)
Dept: PHYSICAL THERAPY | Facility: CLINIC | Age: 69
End: 2024-12-06
Payer: MEDICARE

## 2024-12-06 DIAGNOSIS — S52.501A CLOSED FRACTURE OF DISTAL END OF RIGHT RADIUS, UNSPECIFIED FRACTURE MORPHOLOGY, INITIAL ENCOUNTER: Primary | ICD-10-CM

## 2024-12-06 DIAGNOSIS — M25.631 STIFFNESS OF RIGHT WRIST JOINT: ICD-10-CM

## 2024-12-06 DIAGNOSIS — M25.531 RIGHT WRIST PAIN: ICD-10-CM

## 2024-12-06 DIAGNOSIS — M25.641 STIFFNESS OF RIGHT HAND JOINT: ICD-10-CM

## 2024-12-06 NOTE — PROGRESS NOTES
Occupational Therapy Daily Treatment Note  [place] OT: [address] [place] [address]      Patient: Nicole Abraham   : 1955  Diagnosis/ICD-10 Code:  Closed fracture of distal end of right radius, unspecified fracture morphology, initial encounter [S52.501A]  Referring practitioner: Roderick Cummings MD  Date of Initial Visit: Type: THERAPY  Noted: 2024  Today's Date: 2024  Patient seen for 7 sessions           Subjective   Nicole Abraham reports: No current pain.    Objective     See Exercise, Manual, and Modality Logs for complete treatment.       Assessment/Plan  OT graded up reps with towel grasp activity. Good tolerance to treatment this date. Pt continues with decreased fxl strength/ROM that limit ability to complete ADLs/IADLs.  Progress per Plan of Care           Timed:  Manual Therapy:    8     mins  41234;  Therapeutic Exercise:    8     mins  20215;      Therapeutic Activity:     7     mins  75926;       Timed Treatment:   23   mins   Total Treatment:     31   mins    Start time: 1000  End time: 1031    Armand Lowry OT  Occupational Therapist  [place] License: [id number]  NPI: [id number]

## 2024-12-10 ENCOUNTER — TREATMENT (OUTPATIENT)
Dept: PHYSICAL THERAPY | Facility: CLINIC | Age: 69
End: 2024-12-10
Payer: MEDICARE

## 2024-12-10 DIAGNOSIS — M25.641 STIFFNESS OF RIGHT HAND JOINT: ICD-10-CM

## 2024-12-10 DIAGNOSIS — S52.501A CLOSED FRACTURE OF DISTAL END OF RIGHT RADIUS, UNSPECIFIED FRACTURE MORPHOLOGY, INITIAL ENCOUNTER: Primary | ICD-10-CM

## 2024-12-10 DIAGNOSIS — M25.631 STIFFNESS OF RIGHT WRIST JOINT: ICD-10-CM

## 2024-12-10 DIAGNOSIS — M25.531 RIGHT WRIST PAIN: ICD-10-CM

## 2024-12-10 PROCEDURE — 97140 MANUAL THERAPY 1/> REGIONS: CPT | Performed by: OCCUPATIONAL THERAPIST

## 2024-12-10 PROCEDURE — 97110 THERAPEUTIC EXERCISES: CPT | Performed by: OCCUPATIONAL THERAPIST

## 2024-12-10 NOTE — LETTER
[place]  OT: [address] [facility]  [place] [address]    Patient: Nicole Abraham   : 1955  Diagnosis/ICD-10 Code:  Closed fracture of distal end of right radius, unspecified fracture morphology, initial encounter [S52.501A]  Referring practitioner: Roderick Cummings MD  Date of Initial Visit: Type: THERAPY  Noted: 2024  Today's Date: 12/10/2024  Patient seen for 8 sessions    Pt displays improved digit and wrist ROM. Swelling has decreased. Pt continues with shifting at the DRUJ but reports this is improving as well. Pt continues with swelling and decreased ROM that is functionally limiting. Please advise on PROM at wrist in flexion and extension as well as gentle strengthening. Thank you.    Active Range of Motion     Right Elbow   Flexion: WFL  Extension: WFL  Forearm supination: 20 degrees with pain  Forearm pronation: WFL    Left Wrist   Wrist flexion: 95 degrees   Wrist extension: 75 degrees   Radial deviation: 25 degrees   Ulnar deviation: 35 degrees     Right Wrist   Wrist flexion: 60 degrees with pain  Wrist extension: 40 degrees   Radial deviation: 5 degrees   Ulnar deviation: 20 degrees     Right Thumb   Opposition: Pt able to oppose to tip of small finger.    Additional Active Range of Motion Details  Pt reports stiffness in R elbow with extension. Supination measurement is from eval. N/A this date secondary to shifting occurring.  5 cm at  from [facility].    Strength/Myotome Testing     Left Wrist/Hand      (2nd hand position)     Trial 1: 25 lbs    Trial 2: 20 lbs    Trial 3: 19 lbs    Average: 21.33 lbs    Swelling     Left Wrist/Hand   Thumb     Proximal: 6.25 cm    Distal: 6 cm  Index     Proximal: 6.25 cm    Middle: 5.5 cm    Distal: 5 cm  Middle     Proximal: 6 cm    Middle: 5.5 cm    Distal: 5 cm  Ring     Proximal: 5.5 cm    Middle: 4.5 cm    Distal: 4.5 cm  Little     Proximal: 5 cm    Middle: 4.5 cm    Distal: 4.5 cm  Circumference MCP: 17.5 cm  Circumference wrist: 15.25 cm    Right  Wrist/Hand   Thumb     Proximal: 7.5 cm    Distal: 6.75 cm  Index     Proximal: 7.25 cm    Middle: 6.5 cm    Distal: 5.5 cm  Middle     Proximal: 7.5 cm    Middle: 6.5 cm    Distal: 5 cm  Ring     Proximal: 6.75 cm    Middle: 5.75 cm    Distal: 4.75 cm  Little     Proximal: 5.5 cm    Middle: 4.5 cm    Distal: 4 cm  Circumference MCP: 19 cm  Circumference wrist: 17 cm

## 2024-12-10 NOTE — PROGRESS NOTES
OT Re-evaluation/Progress Note  Marylin  OT: [address]  [place] [address]    Patient: Nicole Abraham   : 1955  Diagnosis/ICD-10 Code:  Closed fracture of distal end of right radius, unspecified fracture morphology, initial encounter [S52.501A]  Referring practitioner: Roderick Cummings MD  Date of Initial Visit: Type: THERAPY  Noted: 2024  Today's Date: 12/10/2024  Patient seen for 8 sessions      Subjective:   Subjective Questionnaire: QuickDASH:   Clinical Progress: improved  Home Program Compliance: Yes  Treatment has included: therapeutic exercise, manual therapy, and therapeutic activity    Subjective Evaluation    Pain  Current pain ratin       Objective          Observations     Additional Wrist/Hand Observation Details  Swelling visible in R digits/hand/wrist.    Neurological Testing     Additional Neurological Details  Pt reports no numbness/tingling.    Active Range of Motion     Right Elbow   Flexion: WFL  Extension: WFL  Forearm supination: 20 degrees with pain  Forearm pronation: WFL    Left Wrist   Wrist flexion: 95 degrees   Wrist extension: 75 degrees   Radial deviation: 25 degrees   Ulnar deviation: 35 degrees     Right Wrist   Wrist flexion: 60 degrees with pain  Wrist extension: 40 degrees   Radial deviation: 5 degrees   Ulnar deviation: 20 degrees     Right Thumb   Opposition: Pt able to oppose to tip of small finger.    Additional Active Range of Motion Details  Pt reports stiffness in R elbow with extension. Supination measurement is from eval. N/A this date secondary to shifting occurring.  5 cm at MF from [facility].    Strength/Myotome Testing     Left Wrist/Hand      (2nd hand position)     Trial 1: 25 lbs    Trial 2: 20 lbs    Trial 3: 19 lbs    Average: 21.33 lbs    Swelling     Left Wrist/Hand   Thumb     Proximal: 6.25 cm    Distal: 6 cm  Index     Proximal: 6.25 cm    Middle: 5.5 cm    Distal: 5 cm  Middle     Proximal: 6 cm    Middle: 5.5 cm    Distal: 5  cm  Ring     Proximal: 5.5 cm    Middle: 4.5 cm    Distal: 4.5 cm  Little     Proximal: 5 cm    Middle: 4.5 cm    Distal: 4.5 cm  Circumference MCP: 17.5 cm  Circumference wrist: 15.25 cm    Right Wrist/Hand   Thumb     Proximal: 7.5 cm    Distal: 6.75 cm  Index     Proximal: 7.25 cm    Middle: 6.5 cm    Distal: 5.5 cm  Middle     Proximal: 7.5 cm    Middle: 6.5 cm    Distal: 5 cm  Ring     Proximal: 6.75 cm    Middle: 5.75 cm    Distal: 4.75 cm  Little     Proximal: 5.5 cm    Middle: 4.5 cm    Distal: 4 cm  Circumference MCP: 19 cm  Circumference wrist: 17 cm      Assessment & Plan       Assessment  Impairments: abnormal or restricted ROM, activity intolerance, impaired physical strength, lacks appropriate home exercise program and pain with function   Functional limitations: carrying objects, lifting, sleeping, pulling, pushing and uncomfortable because of pain   Assessment details: Pt displays improved ROM and decreased swelling. Pt also reports decreased disability as shown in improved QuickDASH score. Pt continues with shifting at the DRUJ, decreased ROM, and pain with fxl use that limits ability to complete ADLs/IADLs. Pt to return to MD. Pt met 1/4 STGs and partially met another STG.  Prognosis: good    Goals  Plan Goals: The patient complains of pain in the R wrist.  LTG 1  12 weeks:  The patient will report a pain rating of 1/10 at worst in order to improve sleep quality and tolerance to performance of activities of daily living.   Status: New  STG 1  8 weeks:  The patient will report a pain rating of 3/10 at worst.  Status:  New    2.  The patient has limited ROM of the R wrist/hand.  LTG 2  12 weeks:  The patient will demonstrate 80 degrees R wrist flexion, 70 degrees extension, 30 degrees ulnar deviation, 20 degrees radial deviation and ability to make composite fist to improve ability to carry and manipulate objects.  Status: New  STG 2  8 weeks:  The patient will demonstrate 60 degrees of R wrist  flexion, 30 degrees wrist extension, 20 degrees ulnar deviation, 10 degrees radial deviation, and 3 cm from DPC.  Status:  Partially met    3.  The patient has limited strength of the R hand/wrist.  LTG 3  12 weeks:  The patient will demonstrate 25 lbs of  strength and 5/5 wrist strength in order to grasp and manipulate objects.  Status: New  STG 3  8 weeks:  The patient will demonstrate ability to tolerate MMT and  strength testing of R hand/wrist.  Status:  New    4.  Carrying, moving, and handling objects functional limitation.  LTG 4  12 weeks:  The patient will demonstrate 1-19 % limitation by achieving a score of 15/55 on the Quick DASH.  Status: New  STG 4  8 weeks:  The patient will demonstrate 20-39 % limitation by achieving a score of 27/55 on the Quick DASH.  Status:  Met       Plan  Planned modality interventions: cryotherapy and thermotherapy (hydrocollator packs)  Planned therapy interventions: body mechanics training, fine motor coordination training, flexibility, functional ROM exercises, home exercise program, joint mobilization, manual therapy, neuromuscular re-education, postural training, soft tissue mobilization, strengthening, stretching and therapeutic activities  Frequency: 2x week  Duration in weeks: 12  Treatment plan discussed with: patient      Progress toward previous goals: Partially Met      Recommendations: Continue as planned  Timeframe: 2 months  Prognosis to achieve goals: good  Date of last progress/eval note: 11/11/24  OT SIGNATURE: Armand Lowry OT   DATE TREATMENT INITIATED: Type: THERAPY  Noted: 11/11/2024  [place] License: [id number]      Based upon review of the patient's progress and continued therapy plan, it is my medical opinion that Nicole Abraham should continue occupational therapy treatment at [facility] PH[facility]ERAP[facility]  [address] [place] [id number]  [phone].    Signature: __________________________________  Zoila  MD MARY KAY Vaughn NPI: [id number]  Timed:  Manual Therapy:    10     mins  40014;  Therapeutic Exercise:    28     mins  09049;            Timed Treatment:   38   mins   Total Treatment:     38   mins    Start time: 0957  End time: 1035    Please sign and return via fax to [phone]  . Thank you, [facility] Occup[facility] Therapy.

## 2024-12-11 ENCOUNTER — OFFICE VISIT (OUTPATIENT)
Dept: ORTHOPEDIC SURGERY | Facility: CLINIC | Age: 69
End: 2024-12-11
Payer: MEDICARE

## 2024-12-11 VITALS — BODY MASS INDEX: 19.76 KG/M2 | OXYGEN SATURATION: 98 % | HEART RATE: 74 BPM | WEIGHT: 98 LBS | HEIGHT: 59 IN

## 2024-12-11 DIAGNOSIS — M25.531 RIGHT WRIST PAIN: Primary | ICD-10-CM

## 2024-12-11 DIAGNOSIS — S52.501D CLOSED FRACTURE OF DISTAL END OF RIGHT RADIUS WITH ROUTINE HEALING, UNSPECIFIED FRACTURE MORPHOLOGY, SUBSEQUENT ENCOUNTER: ICD-10-CM

## 2024-12-11 NOTE — PROGRESS NOTES
"Chief Complaint  Pain and Follow-up of the Right Wrist    Subjective          Nicole Abraham presents to [facility] for a follow up for her right wrist.     History of Present Illness    The patient presents here today for a follow up for her right wrist. Patient has a right distal radius fracture that we are treating conservatively with initial injury 9/15/2024. She presents today without a brace and is overall doing well. She has been attending outpatient physical therapy. She states she feels a shifting to her wrist with certain range of motion and is having stiffness to her fingers.     No Known Allergies     Social History     Socioeconomic History    Marital status:    Tobacco Use    Smoking status: Never     Passive exposure: Never    Smokeless tobacco: Never   Vaping Use    Vaping status: Never Used   Substance and Sexual Activity    Alcohol use: Not Currently        I reviewed the patient's chief complaint, history of present illness, review of systems, past medical history, surgical history, family history, social history, medications, and allergy list.     REVIEW OF SYSTEMS    Constitutional: Denies fevers, chills, weight loss  Cardiovascular: Denies chest pain, shortness of breath  Skin: Denies rashes, acute skin changes  Neurologic: Denies headache, loss of consciousness  MSK: right wrist pain       Objective   Vital Signs:   Pulse 74   Ht 149.9 cm (59\")   Wt 44.5 kg (98 lb)   SpO2 98%   BMI 19.79 kg/m²     Body mass index is 19.79 kg/m².    Physical Exam    General: Alert. No acute distress.   Right upper extremity: DRUJ stable, mild swelling and tenderness, stiffness with finger flexion, wrist extension and flexion t0 30 degrees, neurovascularly intact, sensation intact to the medial , radial and ulnar nerve, positive  pulses     Procedures    Imaging Results (Most Recent)       Procedure Component Value Units Date/Time    XR Wrist 2 View Right [[id number]] " Resulted: 12/11/24 1027     Updated: 12/11/24 1027    Narrative:      Indications: Follow-up right distal radius fracture    Views: AP and lateral right wrist    Findings: Stable appearing distal radius fracture with healing.  Mild   dorsal angulation.  Bones are osteopenic.  Arthritic changes in the   hand/wrist are stable.    Comparative Data: Comparative data found and reviewed today                     Assessment and Plan        XR Wrist 2 View Right    Result Date: 12/11/2024  Narrative: Indications: Follow-up right distal radius fracture Views: AP and lateral right wrist Findings: Stable appearing distal radius fracture with healing.  Mild dorsal angulation.  Bones are osteopenic.  Arthritic changes in the hand/wrist are stable. Comparative Data: Comparative data found and reviewed today    XR Wrist 2 View Right    Result Date: 11/20/2024  Narrative: Indications: Follow-up right wrist fracture Views: AP and lateral right wrist Findings: Right distal radius fracture is seen with stable alignment compared to previous films.  Callus is forming along the fracture site.  Mild dorsal angulation appears stable.  Degenerative changes are present to the hand and wrist. Comparative Data: Comparative data found and reviewed today.      Diagnoses and all orders for this visit:    1. Right wrist pain (Primary)  -     XR Wrist 2 View Right  -     diclofenac (VOLTAREN) 50 MG EC tablet; Take 1 tablet by mouth 2 (Two) Times a Day.  Dispense: 60 tablet; Refill: 1    2. Closed fracture of distal end of right radius with routine healing, unspecified fracture morphology, subsequent encounter  -     diclofenac (VOLTAREN) 50 MG EC tablet; Take 1 tablet by mouth 2 (Two) Times a Day.  Dispense: 60 tablet; Refill: 1      The patient presents here today for a follow up for her right wrist. X-rays were obtained in the office today and these were reviewed today.     She will continue the diclofenac and outpatient occupational therapy.      Will obtain X-Rays of right wrist at next visit.     Call or return if worsening symptoms.    Scribed for Roderick Cummings MD by Nahomi Robles  12/11/2024   10:27 EST         Follow Up     6 weeks     Patient was given instructions and counseling regarding her condition or for health maintenance advice. Please see specific information pulled into the AVS if appropriate.       I have personally performed the services described in this document as scribed by the above individual and it is both accurate and complete. Roderick Cummings MD 12/11/24 11:37 EST

## 2024-12-13 ENCOUNTER — TREATMENT (OUTPATIENT)
Dept: PHYSICAL THERAPY | Facility: CLINIC | Age: 69
End: 2024-12-13
Payer: MEDICARE

## 2024-12-13 DIAGNOSIS — M25.531 RIGHT WRIST PAIN: ICD-10-CM

## 2024-12-13 DIAGNOSIS — M25.631 STIFFNESS OF RIGHT WRIST JOINT: ICD-10-CM

## 2024-12-13 DIAGNOSIS — S52.501A CLOSED FRACTURE OF DISTAL END OF RIGHT RADIUS, UNSPECIFIED FRACTURE MORPHOLOGY, INITIAL ENCOUNTER: Primary | ICD-10-CM

## 2024-12-13 DIAGNOSIS — M25.641 STIFFNESS OF RIGHT HAND JOINT: ICD-10-CM

## 2024-12-13 PROCEDURE — 97110 THERAPEUTIC EXERCISES: CPT | Performed by: OCCUPATIONAL THERAPIST

## 2024-12-13 PROCEDURE — 97140 MANUAL THERAPY 1/> REGIONS: CPT | Performed by: OCCUPATIONAL THERAPIST

## 2024-12-13 PROCEDURE — 97530 THERAPEUTIC ACTIVITIES: CPT | Performed by: OCCUPATIONAL THERAPIST

## 2024-12-13 NOTE — PROGRESS NOTES
Occupational Therapy Daily Treatment Note  Marylin OT: [address] [place] [address]      Patient: Nicole Abraham   : 1955  Diagnosis/ICD-10 Code:  Closed fracture of distal end of right radius, unspecified fracture morphology, initial encounter [S52.501A]  Referring practitioner: Roderick Cummings MD  Date of Initial Visit: Type: THERAPY  Noted: 2024  Today's Date: 2024  Patient seen for 9 sessions           Subjective   Nicole Abraham reports: No current pain.    Objective     See Exercise, Manual, and Modality Logs for complete treatment.       Assessment/Plan  Attempted wrist strengthening this date. Pt tolerated wrist extension well. Pt unable to supinate past neutral without shifting at DRUJ. OT attempted isometric flexion but pt unable to complete secondary to continued shifting. Pt continues with decreased fxl strength/ROM and increased pain that limits ability to complete ADLs/IADLs.  Progress per Plan of Care           Timed:  Manual Therapy:    10     mins  89281;  Therapeutic Exercise:    8     mins  13732;     Therapeutic Activity:     20     mins  06708;       Timed Treatment:   38   mins   Total Treatment:     38   mins    Start time: 0957  End time: 1035    Armand Lowry OT  Occupational Therapist  [place] License: [id number]  NPI: [id number]

## 2024-12-17 ENCOUNTER — TREATMENT (OUTPATIENT)
Dept: PHYSICAL THERAPY | Facility: CLINIC | Age: 69
End: 2024-12-17
Payer: MEDICARE

## 2024-12-17 DIAGNOSIS — M25.631 STIFFNESS OF RIGHT WRIST JOINT: ICD-10-CM

## 2024-12-17 DIAGNOSIS — M25.641 STIFFNESS OF RIGHT HAND JOINT: ICD-10-CM

## 2024-12-17 DIAGNOSIS — S52.501A CLOSED FRACTURE OF DISTAL END OF RIGHT RADIUS, UNSPECIFIED FRACTURE MORPHOLOGY, INITIAL ENCOUNTER: Primary | ICD-10-CM

## 2024-12-17 DIAGNOSIS — M25.531 RIGHT WRIST PAIN: ICD-10-CM

## 2024-12-17 PROCEDURE — 97140 MANUAL THERAPY 1/> REGIONS: CPT | Performed by: OCCUPATIONAL THERAPIST

## 2024-12-17 PROCEDURE — 97530 THERAPEUTIC ACTIVITIES: CPT | Performed by: OCCUPATIONAL THERAPIST

## 2024-12-17 PROCEDURE — 97110 THERAPEUTIC EXERCISES: CPT | Performed by: OCCUPATIONAL THERAPIST

## 2024-12-17 NOTE — PROGRESS NOTES
Occupational Therapy Daily Treatment Note  Marylin OT: [address] [place] [address]      Patient: Nicole Abraham   : 1955  Diagnosis/ICD-10 Code:  Closed fracture of distal end of right radius, unspecified fracture morphology, initial encounter [S52.501A]  Referring practitioner: Roderick Cummings MD  Date of Initial Visit: Type: THERAPY  Noted: 2024  Today's Date: 2024  Patient seen for 10 sessions           Subjective   Nicole Abraham reports: No current pain.    Objective     See Exercise, Manual, and Modality Logs for complete treatment.       Assessment/Plan  Good tolerance to treatment. Discussed use of wrist widget to provide support at the DRUJ to aid with strengthening. Pt continues with decreased fxl strength/ROM and increased pain that limits ability to complete ADLs/IADLs.  Progress per Plan of Care           Timed:  Manual Therapy:    10     mins  25629;  Therapeutic Exercise:    13     mins  68017;     Therapeutic Activity:     15     mins  81773;       Timed Treatment:   38   mins   Total Treatment:     39   mins    Start time: 1400  End time: 1439    Armand Lowry OT  Occupational Therapist  [place] License: [id number]  NPI: [id number]

## 2024-12-19 ENCOUNTER — TREATMENT (OUTPATIENT)
Dept: PHYSICAL THERAPY | Facility: CLINIC | Age: 69
End: 2024-12-19
Payer: MEDICARE

## 2024-12-19 DIAGNOSIS — M25.641 STIFFNESS OF RIGHT HAND JOINT: ICD-10-CM

## 2024-12-19 DIAGNOSIS — M25.531 RIGHT WRIST PAIN: ICD-10-CM

## 2024-12-19 DIAGNOSIS — S52.501A CLOSED FRACTURE OF DISTAL END OF RIGHT RADIUS, UNSPECIFIED FRACTURE MORPHOLOGY, INITIAL ENCOUNTER: Primary | ICD-10-CM

## 2024-12-19 DIAGNOSIS — M25.631 STIFFNESS OF RIGHT WRIST JOINT: ICD-10-CM

## 2024-12-19 NOTE — PROGRESS NOTES
Occupational Therapy Daily Treatment Note  Marylin OT: [address] [place] [address]      Patient: Nicole Abraham   : 1955  Diagnosis/ICD-10 Code:  Closed fracture of distal end of right radius, unspecified fracture morphology, initial encounter [S52.501A]  Referring practitioner: Roderick Cummings MD  Date of Initial Visit: Type: THERAPY  Noted: 2024  Today's Date: 2024  Patient seen for 11 sessions           Subjective   Nicole Abraham reports: No current pain.    Objective     See Exercise, Manual, and Modality Logs for complete treatment.       Assessment/Plan  Good tolerance to treatment this date. Pt continues with decreased fxl strength/ROM and increased pain that limits ability to complete ADLs/IADLs.  Progress per Plan of Care           Timed:  Manual Therapy:    12     mins  57970;  Therapeutic Exercise:    10     mins  27480;      Therapeutic Activity:     19     mins  37961;       Timed Treatment:   41   mins   Total Treatment:     41   mins    Start time: 1557  End time: 1638    Armand Lowry OT  Occupational Therapist  [place] License: [id number]  NPI: [id number]

## 2024-12-23 ENCOUNTER — TREATMENT (OUTPATIENT)
Dept: PHYSICAL THERAPY | Facility: CLINIC | Age: 69
End: 2024-12-23
Payer: MEDICARE

## 2024-12-23 DIAGNOSIS — M25.631 STIFFNESS OF RIGHT WRIST JOINT: ICD-10-CM

## 2024-12-23 DIAGNOSIS — M25.531 RIGHT WRIST PAIN: ICD-10-CM

## 2024-12-23 DIAGNOSIS — S52.501A CLOSED FRACTURE OF DISTAL END OF RIGHT RADIUS, UNSPECIFIED FRACTURE MORPHOLOGY, INITIAL ENCOUNTER: Primary | ICD-10-CM

## 2024-12-23 PROCEDURE — 97110 THERAPEUTIC EXERCISES: CPT | Performed by: OCCUPATIONAL THERAPIST

## 2024-12-23 PROCEDURE — 97140 MANUAL THERAPY 1/> REGIONS: CPT | Performed by: OCCUPATIONAL THERAPIST

## 2024-12-23 NOTE — PROGRESS NOTES
Occupational Therapy Daily Treatment Note  [address] [facility]   [place]  [address]      Patient: Nicole Abraham   : 1955  Referring practitioner: Roderick Cummings MD  Date of Initial Visit: Type: THERAPY  Noted: 2024  Today's Date: 2024  Patient seen for 12 sessions           Subjective Evaluation    History of Present Illness    Subjective comment: No pain noted.  She got a wrist widget a couple of days ago.  Reports that she feels good in it.  Functional status:  Able to perform more light household activities and all personal care.  Still has difficulty with grasp.Pain  Current pain ratin             Objective   See Exercise, Manual, and Modality Logs for complete treatment.       Assessment & Plan       Assessment  Assessment details: Good tolerance to exercises.  Able to perform limited wrist extension strengthening exercises. Continue with plan of care.        Visit Diagnoses:    ICD-10-CM ICD-9-CM   1. Closed fracture of distal end of right radius, unspecified fracture morphology, initial encounter  S52.501A 813.42   2. Right wrist pain  M25.531 719.43   3. Stiffness of right wrist joint  M25.631 719.53       Progress strengthening /stabilization /functional activity           Timed:  Manual Therapy:                 10     mins  41060  Therapeutic Exercise:      10     mins  47760     Neuromuscular reeducation       0     mins 39925  Therapeutic Activity              10     mins  39798  Ultrasound:                  0     mins  49744     Untimed:  Electrical Stimulation:    0     mins  99191 ( );  Fluidotherapy      0     mins  73522    Timed Treatment:   30   mins   Total Treatment:     30   mins    Deloris Long OT, CHT  Occupational Therapist    Electronically signed      [place] license #: [id number]

## 2025-01-03 ENCOUNTER — TREATMENT (OUTPATIENT)
Dept: PHYSICAL THERAPY | Facility: CLINIC | Age: 70
End: 2025-01-03
Payer: MEDICARE

## 2025-01-03 DIAGNOSIS — M25.631 STIFFNESS OF RIGHT WRIST JOINT: ICD-10-CM

## 2025-01-03 DIAGNOSIS — S52.501A CLOSED FRACTURE OF DISTAL END OF RIGHT RADIUS, UNSPECIFIED FRACTURE MORPHOLOGY, INITIAL ENCOUNTER: Primary | ICD-10-CM

## 2025-01-03 DIAGNOSIS — M25.531 RIGHT WRIST PAIN: ICD-10-CM

## 2025-01-03 DIAGNOSIS — M25.641 STIFFNESS OF RIGHT HAND JOINT: ICD-10-CM

## 2025-01-03 NOTE — PROGRESS NOTES
Occupational Therapy Daily Treatment Note  Marylin OT: [address] [place] [address]      Patient: Nicole Abraham   : 1955  Diagnosis/ICD-10 Code:  Closed fracture of distal end of right radius, unspecified fracture morphology, initial encounter [S52.501A]  Referring practitioner: Roderick Cummings MD  Date of Initial Visit: Type: THERAPY  Noted: 2024  Today's Date: 1/3/2025  Patient seen for 13 sessions           Subjective   Nicole Abraham reports: No current pain. Pt reports wrist widget has improved symptoms in wrist.    Objective     See Exercise, Manual, and Modality Logs for complete treatment.       Assessment/Plan  OT provided active wrist flexion and extension stretch and hold HEP. Good tolerance to treatment. Pt displays improved pro/sup with wrist widget on. Pt continues with decreased fxl strength/ROM and increased pain that limits ability to complete ADLs/IADLs.  Progress per Plan of Care           Timed:  Manual Therapy:    10     mins  62047;  Therapeutic Exercise:    14     mins  89383;       Therapeutic Activity:     20     mins  95575;       Timed Treatment:   44   mins   Total Treatment:     44   mins    Start time: 0956  End time: 1040    Armand Lowry OT  Occupational Therapist  [place] License: [id number]  NPI: [id number]

## 2025-01-07 ENCOUNTER — TREATMENT (OUTPATIENT)
Dept: PHYSICAL THERAPY | Facility: CLINIC | Age: 70
End: 2025-01-07
Payer: MEDICARE

## 2025-01-07 DIAGNOSIS — S52.501A CLOSED FRACTURE OF DISTAL END OF RIGHT RADIUS, UNSPECIFIED FRACTURE MORPHOLOGY, INITIAL ENCOUNTER: Primary | ICD-10-CM

## 2025-01-07 DIAGNOSIS — M25.631 STIFFNESS OF RIGHT WRIST JOINT: ICD-10-CM

## 2025-01-07 DIAGNOSIS — M25.641 STIFFNESS OF RIGHT HAND JOINT: ICD-10-CM

## 2025-01-07 DIAGNOSIS — M25.531 RIGHT WRIST PAIN: ICD-10-CM

## 2025-01-07 PROCEDURE — 97140 MANUAL THERAPY 1/> REGIONS: CPT | Performed by: OCCUPATIONAL THERAPIST

## 2025-01-07 PROCEDURE — 97110 THERAPEUTIC EXERCISES: CPT | Performed by: OCCUPATIONAL THERAPIST

## 2025-01-07 NOTE — PROGRESS NOTES
OT Re-evaluation/Progress Note  Marylin  OT: [address]  [place] [address]    Patient: Nicole Abraham   : 1955  Diagnosis/ICD-10 Code:  Closed fracture of distal end of right radius, unspecified fracture morphology, initial encounter [S52.501A]  Referring practitioner: Roderick Cummings MD  Date of Initial Visit: Type: THERAPY  Noted: 2024  Today's Date: 2025  Patient seen for 14 sessions      Subjective:   Subjective Questionnaire: QuickDASH:   Clinical Progress: improved  Home Program Compliance: Yes  Treatment has included: therapeutic exercise, manual therapy, and therapeutic activity    Subjective Evaluation    Pain  Current pain ratin       Objective          Observations     Additional Wrist/Hand Observation Details  Swelling visible in R digits/hand/wrist.    Neurological Testing     Additional Neurological Details  Pt reports no numbness/tingling.    Active Range of Motion     Right Elbow   Flexion: WFL  Extension: WFL  Forearm supination: 35 degrees with pain  Forearm pronation: WFL    Left Wrist   Wrist flexion: 95 degrees   Wrist extension: 75 degrees   Radial deviation: 25 degrees   Ulnar deviation: 35 degrees     Right Wrist   Wrist flexion: 60 degrees with pain  Wrist extension: 40 degrees   Radial deviation: 5 degrees   Ulnar deviation: 10 degrees with pain    Right Thumb   Opposition: Pt able to oppose to P2 of small finger.    Additional Active Range of Motion Details  4.5 cm at MF from DPC.    Strength/Myotome Testing     Left Wrist/Hand      (2nd hand position)     Trial 1: 25 lbs    Trial 2: 20 lbs    Trial 3: 19 lbs    Average: 21.33 lbs    Swelling     Left Wrist/Hand   Thumb     Proximal: 6.25 cm    Distal: 6 cm  Index     Proximal: 6.25 cm    Middle: 5.5 cm    Distal: 5 cm  Middle     Proximal: 6 cm    Middle: 5.5 cm    Distal: 5 cm  Ring     Proximal: 5.5 cm    Middle: 4.5 cm    Distal: 4.5 cm  Little     Proximal: 5 cm    Middle: 4.5 cm    Distal: 4.5  cm  Circumference MCP: 17.5 cm  Circumference wrist: 15.25 cm    Right Wrist/Hand   Thumb     Proximal: 7 cm    Distal: 6.5 cm  Index     Proximal: 7 cm    Middle: 6 cm    Distal: 5 cm  Middle     Proximal: 6.75 cm    Middle: 6 cm    Distal: 5 cm  Ring     Proximal: 6.25 cm    Middle: 5.5 cm    Distal: 4.25 cm  Little     Proximal: 5.5 cm    Middle: 4.5 cm    Distal: 4 cm  Circumference MCP: 18 cm  Circumference wrist: 15 cm      Assessment & Plan       Assessment  Impairments: abnormal or restricted ROM, activity intolerance, impaired physical strength, lacks appropriate home exercise program and pain with function   Functional limitations: carrying objects, lifting, sleeping, pulling, pushing and uncomfortable because of pain   Assessment details: Pt displays improved digit flexion and forearm supination. Pt also displays decreased swelling. Pt reports decreased disability as shown in improved QuickDASH score. Pt reports decreased shifting at UJ since donning wrist widget although this continues to be limiting. Pt also continues with decreased ROM and increased pain with fxl use that limits ability to complete ADLs/IADLs. Pt cleared to begin PROM. Pt met 1/4 STGs and partially met another STG.  Prognosis: good    Goals  Plan Goals: The patient complains of pain in the R wrist.  LTG 1  12 weeks:  The patient will report a pain rating of 1/10 at worst in order to improve sleep quality and tolerance to performance of activities of daily living.   Status: New  STG 1  8 weeks:  The patient will report a pain rating of 3/10 at worst.  Status:  New    2.  The patient has limited ROM of the R wrist/hand.  LTG 2  12 weeks:  The patient will demonstrate 80 degrees R wrist flexion, 70 degrees extension, 30 degrees ulnar deviation, 20 degrees radial deviation and ability to make composite fist to improve ability to carry and manipulate objects.  Status: New  STG 2  8 weeks:  The patient will demonstrate 60 degrees of R  wrist flexion, 30 degrees wrist extension, 20 degrees ulnar deviation, 10 degrees radial deviation, and 3 cm from DPC.  Status:  Partially met    3.  The patient has limited strength of the R hand/wrist.  LTG 3  12 weeks:  The patient will demonstrate 25 lbs of  strength and 5/5 wrist strength in order to grasp and manipulate objects.  Status: New  STG 3  8 weeks:  The patient will demonstrate ability to tolerate MMT and  strength testing of R hand/wrist.  Status:  New    4.  Carrying, moving, and handling objects functional limitation.  LTG 4  12 weeks:  The patient will demonstrate 1-19 % limitation by achieving a score of 15/55 on the Quick DASH.  Status: New  STG 4  8 weeks:  The patient will demonstrate 20-39 % limitation by achieving a score of 27/55 on the Quick DASH.  Status:  Met       Plan  Planned modality interventions: cryotherapy and thermotherapy (hydrocollator packs)  Planned therapy interventions: body mechanics training, fine motor coordination training, flexibility, functional ROM exercises, home exercise program, joint mobilization, manual therapy, neuromuscular re-education, postural training, soft tissue mobilization, strengthening, stretching and therapeutic activities  Frequency: 2x week  Duration in weeks: 12  Treatment plan discussed with: patient      Progress toward previous goals: Partially Met      Recommendations: Continue with recommendations for increased PROM  Timeframe: 1 month  Prognosis to achieve goals: good  Date of last progress/eval note: 12/10/24  OT SIGNATURE: Armand Lowry OT   DATE TREATMENT INITIATED: Type: THERAPY  Noted: 11/11/2024  [place] License: [id number]  Based upon review of the patient's progress and continued therapy plan, it is my medical opinion that Nicole Abraham should continue occupational therapy treatment at [facility] PH[facility] THERAP[facility]  [address] [place] [id number]  [phone].    Signature:  __________________________________  Roderick Cummings MD MD NPI: [id number]  Timed:  Manual Therapy:    10     mins  98010;  Therapeutic Exercise:    24     mins  81774;     Therapeutic Activity:     6     mins  42891;       Timed Treatment:   40   mins   Total Treatment:     40   mins    Start time: 0955  End time: 1035    Please sign and return via fax to [phone]  . Thank you, [facility] Occupational Therapy.

## 2025-01-14 ENCOUNTER — TREATMENT (OUTPATIENT)
Dept: PHYSICAL THERAPY | Facility: CLINIC | Age: 70
End: 2025-01-14
Payer: MEDICARE

## 2025-01-14 DIAGNOSIS — S52.501A CLOSED FRACTURE OF DISTAL END OF RIGHT RADIUS, UNSPECIFIED FRACTURE MORPHOLOGY, INITIAL ENCOUNTER: Primary | ICD-10-CM

## 2025-01-14 DIAGNOSIS — M25.641 STIFFNESS OF RIGHT HAND JOINT: ICD-10-CM

## 2025-01-14 DIAGNOSIS — M25.631 STIFFNESS OF RIGHT WRIST JOINT: ICD-10-CM

## 2025-01-14 DIAGNOSIS — M25.531 RIGHT WRIST PAIN: ICD-10-CM

## 2025-01-14 PROCEDURE — 97140 MANUAL THERAPY 1/> REGIONS: CPT | Performed by: OCCUPATIONAL THERAPIST

## 2025-01-14 PROCEDURE — 97530 THERAPEUTIC ACTIVITIES: CPT | Performed by: OCCUPATIONAL THERAPIST

## 2025-01-14 PROCEDURE — 97110 THERAPEUTIC EXERCISES: CPT | Performed by: OCCUPATIONAL THERAPIST

## 2025-01-14 NOTE — PROGRESS NOTES
Occupational Therapy Daily Treatment Note  Marylin OT: [address] [place] [address]      Patient: Nicole Abraham   : 1955  Diagnosis/ICD-10 Code:  Closed fracture of distal end of right radius, unspecified fracture morphology, initial encounter [S52.501A]  Referring practitioner: Roderick Cummings MD  Date of Initial Visit: Type: THERAPY  Noted: 2024  Today's Date: 2025  Patient seen for 15 sessions           Subjective   Nicole Abraham reports: No current pain.    Objective     See Exercise, Manual, and Modality Logs for complete treatment.       Assessment/Plan  Good tolerance to treatment. Pt continues with decreased fxl strength/ROM and increased pain that limits ability to complete ADLs/IADLs.  Progress per Plan of Care           Timed:  Manual Therapy:    10     mins  14515;  Therapeutic Exercise:    16     mins  75738;      Therapeutic Activity:     12     mins  84678;       Timed Treatment:   38   mins   Total Treatment:     38   mins    Start time: 0954  End time: 1032    Armand Lowry OT  Occupational Therapist  [place] License: [id number]  NPI: [id number]

## 2025-01-17 ENCOUNTER — TREATMENT (OUTPATIENT)
Dept: PHYSICAL THERAPY | Facility: CLINIC | Age: 70
End: 2025-01-17
Payer: MEDICARE

## 2025-01-17 DIAGNOSIS — M25.641 STIFFNESS OF RIGHT HAND JOINT: ICD-10-CM

## 2025-01-17 DIAGNOSIS — M25.631 STIFFNESS OF RIGHT WRIST JOINT: ICD-10-CM

## 2025-01-17 DIAGNOSIS — S52.501A CLOSED FRACTURE OF DISTAL END OF RIGHT RADIUS, UNSPECIFIED FRACTURE MORPHOLOGY, INITIAL ENCOUNTER: Primary | ICD-10-CM

## 2025-01-17 DIAGNOSIS — M25.531 RIGHT WRIST PAIN: ICD-10-CM

## 2025-01-17 NOTE — PROGRESS NOTES
Occupational Therapy Daily Treatment Note  Marylin OT: [address] [place] [address]      Patient: Nicole Abraham   : 1955  Diagnosis/ICD-10 Code:  Closed fracture of distal end of right radius, unspecified fracture morphology, initial encounter [S52.501A]  Referring practitioner: Roderick Cummings MD  Date of Initial Visit: Type: THERAPY  Noted: 2024  Today's Date: 2025  Patient seen for 16 sessions           Subjective   Nicole Abraham reports: No current pain.    Objective     See Exercise, Manual, and Modality Logs for complete treatment.       Assessment/Plan  Pt displays improved wrist flexion after passive stretching. Pt again demonstrates shifting at DRUJ with supination. Good tolerance to remainder of treatment. Pt continues with decreased fxl strength/ROM and increased pain that limits ability to complete ADLs/IADLs.  Progress per Plan of Care           Timed:  Manual Therapy:    11     mins  50143;  Therapeutic Exercise:    20     mins  94819;       Therapeutic Activity:     11     mins  42762;       Timed Treatment:   42   mins   Total Treatment:     42   mins    Start time: 0953  End time: 1035    Armand Lowry OT  Occupational Therapist  [place] License: [id number]  NPI: [id number]

## 2025-01-21 ENCOUNTER — TREATMENT (OUTPATIENT)
Dept: PHYSICAL THERAPY | Facility: CLINIC | Age: 70
End: 2025-01-21
Payer: MEDICARE

## 2025-01-21 DIAGNOSIS — M25.531 RIGHT WRIST PAIN: ICD-10-CM

## 2025-01-21 DIAGNOSIS — S52.501A CLOSED FRACTURE OF DISTAL END OF RIGHT RADIUS, UNSPECIFIED FRACTURE MORPHOLOGY, INITIAL ENCOUNTER: Primary | ICD-10-CM

## 2025-01-21 DIAGNOSIS — M25.631 STIFFNESS OF RIGHT WRIST JOINT: ICD-10-CM

## 2025-01-21 DIAGNOSIS — M25.641 STIFFNESS OF RIGHT HAND JOINT: ICD-10-CM

## 2025-01-21 PROCEDURE — 97530 THERAPEUTIC ACTIVITIES: CPT | Performed by: OCCUPATIONAL THERAPIST

## 2025-01-21 PROCEDURE — 97110 THERAPEUTIC EXERCISES: CPT | Performed by: OCCUPATIONAL THERAPIST

## 2025-01-21 PROCEDURE — 97140 MANUAL THERAPY 1/> REGIONS: CPT | Performed by: OCCUPATIONAL THERAPIST

## 2025-01-21 NOTE — PROGRESS NOTES
Occupational Therapy Daily Treatment Note  Marylin OT: [address] [place] [address]      Patient: Nicole Abraham   : 1955  Diagnosis/ICD-10 Code:  Closed fracture of distal end of right radius, unspecified fracture morphology, initial encounter [S52.501A]  Referring practitioner: Roderick Cummings MD  Date of Initial Visit: Type: THERAPY  Noted: 2024  Today's Date: 2025  Patient seen for 17 sessions           Subjective   Nicole Abraham reports: No current pain.    Objective          Observations     Additional Wrist/Hand Observation Details  Swelling visible in R digits/hand/wrist.    Neurological Testing     Additional Neurological Details  Pt reports no numbness/tingling.    Active Range of Motion     Right Elbow   Flexion: WFL  Extension: WFL  Forearm supination: 20 degrees   Forearm pronation: WFL    Left Wrist   Wrist flexion: 95 degrees   Wrist extension: 75 degrees   Radial deviation: 25 degrees   Ulnar deviation: 35 degrees     Right Wrist   Wrist flexion: 66 degrees   Wrist extension: 46 degrees with pain  Radial deviation: 5 degrees   Ulnar deviation: 10 degrees     Right Thumb   Opposition: Pt able to oppose to base of small finger.    Additional Active Range of Motion Details  Shifting continues with supination. Measurement is with wrist brace on. Pt supinates to 0 without brace.  3.5 cm at MF from DPC.    Strength/Myotome Testing     Left Wrist/Hand      (2nd hand position)     Trial 1: 25 lbs    Trial 2: 20 lbs    Trial 3: 19 lbs    Average: 21.33 lbs    Swelling     Left Wrist/Hand   Thumb     Proximal: 6.25 cm    Distal: 6 cm  Index     Proximal: 6.25 cm    Middle: 5.5 cm    Distal: 5 cm  Middle     Proximal: 6 cm    Middle: 5.5 cm    Distal: 5 cm  Ring     Proximal: 5.5 cm    Middle: 4.5 cm    Distal: 4.5 cm  Little     Proximal: 5 cm    Middle: 4.5 cm    Distal: 4.5 cm  Circumference MCP: 17.5 cm  Circumference wrist: 15.25 cm    Right Wrist/Hand   Thumb     Proximal: 7  cm    Distal: 6 cm  Index     Proximal: 6.75 cm    Middle: 5.75 cm    Distal: 5 cm  Middle     Proximal: 6.75 cm    Middle: 5.75 cm    Distal: 5 cm  Ring     Proximal: 6.25 cm    Middle: 5 cm    Distal: 4.25 cm  Little     Proximal: 5.5 cm    Middle: 4.5 cm    Distal: 4 cm  Circumference MCP: 18 cm  Circumference wrist: 14.5 cm        See Exercise, Manual, and Modality Logs for complete treatment.       Assessment/Plan  Pt displays improved active wrist flexion and extension and digit flexion. Shifting at UJ continues to be limiting in supination. Pt to return to MD. Pt continues with decreased fxl strength/ROM and increased pain that limits ability to complete ADLs/IADLs.  Progress per Plan of Care           Timed:  Manual Therapy:    10     mins  13019;  Therapeutic Exercise:    13     mins  43658;     Therapeutic Activity:     15     mins  68105;       Timed Treatment:   38   mins   Total Treatment:     38   mins    Start time: 0959  End time: 1037    Armand Lowry OT  Occupational Therapist  [place] License: [id number]  NPI: [id number]

## 2025-01-21 NOTE — LETTER
Marylin OT: [address] [facility] [place] [address]      Patient: Nicole Abraham   : 1955  Diagnosis/ICD-10 Code:  Closed fracture of distal end of right radius, unspecified fracture morphology, initial encounter [S52.501A]  Referring practitioner: Roderick Cummings MD  Date of Initial Visit: Type: THERAPY  Noted: 2024  Today's Date: 2025  Patient seen for 17 sessions    Pt continues to improved wrist flexion and extension and digit ROM although progress has been slow. Swelling continues to improve. Supination is still limited by shifting at DRUJ and pt may benefit from additional testing. Therapy has focused on A/PROM and light wrist strengthening. Please advise after follow up. Thank you.    Active Range of Motion     Right Elbow   Flexion: WFL  Extension: WFL  Forearm supination: 20 degrees   Forearm pronation: WFL    Left Wrist   Wrist flexion: 95 degrees   Wrist extension: 75 degrees   Radial deviation: 25 degrees   Ulnar deviation: 35 degrees     Right Wrist   Wrist flexion: 66 degrees   Wrist extension: 46 degrees with pain  Radial deviation: 5 degrees   Ulnar deviation: 10 degrees     Right Thumb   Opposition: Pt able to oppose to base of small finger.    Additional Active Range of Motion Details  Shifting continues with supination. Measurement is with wrist brace on. Pt supinates to 0 without brace.  3.5 cm at MF from DPC.

## 2025-01-22 ENCOUNTER — OFFICE VISIT (OUTPATIENT)
Dept: ORTHOPEDIC SURGERY | Facility: CLINIC | Age: 70
End: 2025-01-22
Payer: MEDICARE

## 2025-01-22 VITALS — OXYGEN SATURATION: 94 % | HEART RATE: 98 BPM | SYSTOLIC BLOOD PRESSURE: 126 MMHG | DIASTOLIC BLOOD PRESSURE: 78 MMHG

## 2025-01-22 DIAGNOSIS — S52.501D CLOSED FRACTURE OF DISTAL END OF RIGHT RADIUS WITH ROUTINE HEALING, UNSPECIFIED FRACTURE MORPHOLOGY, SUBSEQUENT ENCOUNTER: Primary | ICD-10-CM

## 2025-01-22 RX ORDER — MELOXICAM 7.5 MG/1
7.5 TABLET ORAL DAILY
Qty: 30 TABLET | Refills: 0 | Status: SHIPPED | OUTPATIENT
Start: 2025-01-22

## 2025-01-22 NOTE — PROGRESS NOTES
Chief Complaint  Follow-up of the Right Wrist    Subjective          Nicole Abraham presents to [facility]   History of Present Illness    Nicole Abraham presents today for a follow-up of her right wrist.  Patient has a right distal radius fracture that we have been treating conservatively.  Initial injury 9/15/2024.  Today, patient states that she is doing okay.  She is continued occupational therapy.  She reports slow progress with gradual improvements.  She states that her swelling does continue to improve.  She reports minimal pain overall.  She has been wearing a DRUJ brace majority of the time.  Denies any new injuries.  She states that her biggest issue is a stiffness with her fingers.  Patient has been taking diclofenac without any significant relief.      No Known Allergies     Social History     Socioeconomic History    Marital status:    Tobacco Use    Smoking status: Never     Passive exposure: Never    Smokeless tobacco: Never   Vaping Use    Vaping status: Never Used   Substance and Sexual Activity    Alcohol use: Not Currently        I reviewed the patient's chief complaint, history of present illness, review of systems, past medical history, surgical history, family history, social history, medications, and allergy list.     REVIEW OF SYSTEMS    Constitutional: Denies fevers, chills, weight loss  Cardiovascular: Denies chest pain, shortness of breath  Skin: Denies rashes, acute skin changes  Neurologic: Denies headache, loss of consciousness  MSK: Right wrist pain      Objective   Vital Signs:   /78   Pulse 98   SpO2 94%     There is no height or weight on file to calculate BMI.    Physical Exam    General: Alert. No acute distress.   Right upper extremity: Forearm soft.  Demonstrates active wrist flexion and extension with associated stiffness.  Wrist extension 5.  Wrist flexion 30.  Mild swelling to the fingers.  Stiffness with finger flexion, unable  to get fingers to the palm.  Full finger extension.  Thumb opposition intact.  Palmar abduction of thumb intact.  Sensation intact to the median, radial, and ulnar nerve distributions.  Palpable radial pulse.    Procedures    Imaging Results (Most Recent)       Procedure Component Value Units Date/Time    XR Wrist 2 View Right [[id number]] Resulted: 01/22/25 1002     Updated: 01/22/25 1002    Narrative:      Indications: Follow-up right distal radius fracture    Views: AP and lateral right wrist    Findings: Right distal radius fracture is stable.  Arthritic changes in   the hand and wrist are seen.    Comparative Data: Comparative data found and reviewed today.                   Assessment and Plan    Diagnoses and all orders for this visit:    1. Closed fracture of distal end of right radius with routine healing, unspecified fracture morphology, subsequent encounter (Primary)  -     XR Wrist 2 View Right  -     meloxicam (Mobic) 7.5 MG tablet; Take 1 tablet by mouth Daily.  Dispense: 30 tablet; Refill: 0        Nicole Abraham presents today to follow-up with her right distal radius fracture that we have been treating conservatively with initial injury 9/15/2024.  X-rays reviewed with the patient today.  Patient is instructed to continue with formal occupational therapy as well as her home exercises.  Meloxicam was ordered today.  Okay to wear brace as needed.      Patient will follow up in 6 weeks for reevaluation.  We will obtain new x-rays of the right wrist at next visit.      Call or return if symptoms worsen or patient has any concerns.       Follow Up   Return in about 6 weeks (around 3/5/2025).  Patient was given instructions and counseling regarding her condition or for health maintenance advice. Please see specific information pulled into the AVS if appropriate.     Samina Taveras PA-C  01/22/25  10:19 EST

## 2025-01-24 ENCOUNTER — TREATMENT (OUTPATIENT)
Dept: PHYSICAL THERAPY | Facility: CLINIC | Age: 70
End: 2025-01-24
Payer: MEDICARE

## 2025-01-24 DIAGNOSIS — S52.501A CLOSED FRACTURE OF DISTAL END OF RIGHT RADIUS, UNSPECIFIED FRACTURE MORPHOLOGY, INITIAL ENCOUNTER: Primary | ICD-10-CM

## 2025-01-24 DIAGNOSIS — M25.531 RIGHT WRIST PAIN: ICD-10-CM

## 2025-01-24 DIAGNOSIS — M25.641 STIFFNESS OF RIGHT HAND JOINT: ICD-10-CM

## 2025-01-24 DIAGNOSIS — M25.631 STIFFNESS OF RIGHT WRIST JOINT: ICD-10-CM

## 2025-01-24 NOTE — PROGRESS NOTES
Occupational Therapy Daily Treatment Note  Marylin OT: [address] [place] [address]      Patient: Nicole Abraham   : 1955  Diagnosis/ICD-10 Code:  Closed fracture of distal end of right radius, unspecified fracture morphology, initial encounter [S52.501A]  Referring practitioner: Roderick Cummings MD  Date of Initial Visit: Type: THERAPY  Noted: 2024  Today's Date: 2025  Patient seen for 18 sessions           Subjective   Nicole Abraham reports: No current pain. Pt reports at recent MD visit she was given a new medicine and that this has helped the stiffness in her hand.    Objective     See Exercise, Manual, and Modality Logs for complete treatment.       Assessment/Plan  Good tolerance to treatment. Pt able to complete wrist stabilization exercise with no shifting noted. Pt continues with decreased fxl strength/ROM and increased pain that limits ability to complete ADLs/IADLs.  Progress per Plan of Care           Timed:  Manual Therapy:    10     mins  31388;  Therapeutic Exercise:    13     mins  65831;      Therapeutic Activity:     15     mins  00611;       Timed Treatment:   38   mins   Total Treatment:     41   mins    Start time: 0958  End time: 1039    Armand Lowry OT  Occupational Therapist  [place] License: [id number]  NPI: [id number]

## 2025-01-28 ENCOUNTER — TREATMENT (OUTPATIENT)
Dept: PHYSICAL THERAPY | Facility: CLINIC | Age: 70
End: 2025-01-28
Payer: MEDICARE

## 2025-01-28 DIAGNOSIS — M25.531 RIGHT WRIST PAIN: ICD-10-CM

## 2025-01-28 DIAGNOSIS — M25.631 STIFFNESS OF RIGHT WRIST JOINT: ICD-10-CM

## 2025-01-28 DIAGNOSIS — M25.641 STIFFNESS OF RIGHT HAND JOINT: ICD-10-CM

## 2025-01-28 DIAGNOSIS — S52.501A CLOSED FRACTURE OF DISTAL END OF RIGHT RADIUS, UNSPECIFIED FRACTURE MORPHOLOGY, INITIAL ENCOUNTER: Primary | ICD-10-CM

## 2025-01-28 NOTE — PROGRESS NOTES
Occupational Therapy Daily Treatment Note  Marylin OT: [address] [place] [address]      Patient: Nicole Abraham   : 1955  Diagnosis/ICD-10 Code:  Closed fracture of distal end of right radius, unspecified fracture morphology, initial encounter [S52.501A]  Referring practitioner: Roderick Cummings MD  Date of Initial Visit: Type: THERAPY  Noted: 2024  Today's Date: 2025  Patient seen for 19 sessions           Subjective   Nicole Abraham reports: No current pain.    Objective     See Exercise, Manual, and Modality Logs for complete treatment.       Assessment/Plan  Good tolerance to treatment. Pt able to complete wrist maze activity this date. Pt continues with decreased fxl strength/ROM and increased pain that limits ability to complete ADLs/IADLs.  Progress per Plan of Care           Timed:  Manual Therapy:    10     mins  84830;  Therapeutic Exercise:    15     mins  39344;     Therapeutic Activity:     13     mins  86714;       Timed Treatment:   38   mins   Total Treatment:     39   mins    Start time: 1000  End time: 1039    Armand Lowry OT  Occupational Therapist  [place] License: [id number]  NPI: [id number]

## 2025-01-31 ENCOUNTER — TREATMENT (OUTPATIENT)
Dept: PHYSICAL THERAPY | Facility: CLINIC | Age: 70
End: 2025-01-31
Payer: MEDICARE

## 2025-01-31 DIAGNOSIS — S52.501A CLOSED FRACTURE OF DISTAL END OF RIGHT RADIUS, UNSPECIFIED FRACTURE MORPHOLOGY, INITIAL ENCOUNTER: Primary | ICD-10-CM

## 2025-01-31 DIAGNOSIS — M25.531 RIGHT WRIST PAIN: ICD-10-CM

## 2025-01-31 DIAGNOSIS — M25.641 STIFFNESS OF RIGHT HAND JOINT: ICD-10-CM

## 2025-01-31 DIAGNOSIS — M25.631 STIFFNESS OF RIGHT WRIST JOINT: ICD-10-CM

## 2025-01-31 NOTE — PROGRESS NOTES
Occupational Therapy Daily Treatment Note  [place] OT: [address] [place] [address]      Patient: Nicole Abraham   : 1955  Diagnosis/ICD-10 Code:  Closed fracture of distal end of right radius, unspecified fracture morphology, initial encounter [S52.501A]  Referring practitioner: Roderick Cummings MD  Date of Initial Visit: Type: THERAPY  Noted: 2024  Today's Date: 2025  Patient seen for 20 sessions           Subjective   Nicole Abraham reports: R wrist is doing well. No current pain.    Objective     See Exercise, Manual, and Modality Logs for complete treatment.       Assessment/Plan  OT graded up reps with wrist maze this date. Good tolerance to treatment. Pt continues with decreased fxl strength/ROM and increased pain that limits ability to complete ADLs/IADLs.  Progress per Plan of Care           Timed:  Manual Therapy:    10     mins  80328;  Therapeutic Exercise:    10     mins  07169;     Therapeutic Activity:     18     mins  93206;       Timed Treatment:   38   mins   Total Treatment:     38   mins    Start time: 0957  End time: 1035    Armand Lowry OT  Occupational Therapist  [place] License: [id number]  NPI: [id number]

## 2025-02-04 ENCOUNTER — TREATMENT (OUTPATIENT)
Dept: PHYSICAL THERAPY | Facility: CLINIC | Age: 70
End: 2025-02-04
Payer: MEDICARE

## 2025-02-04 DIAGNOSIS — M25.531 RIGHT WRIST PAIN: ICD-10-CM

## 2025-02-04 DIAGNOSIS — M25.631 STIFFNESS OF RIGHT WRIST JOINT: ICD-10-CM

## 2025-02-04 DIAGNOSIS — M25.641 STIFFNESS OF RIGHT HAND JOINT: ICD-10-CM

## 2025-02-04 DIAGNOSIS — S52.501A CLOSED FRACTURE OF DISTAL END OF RIGHT RADIUS, UNSPECIFIED FRACTURE MORPHOLOGY, INITIAL ENCOUNTER: Primary | ICD-10-CM

## 2025-02-04 NOTE — PROGRESS NOTES
"OT Re-evaluation/Progress Note  Marylin  OT: [address]  [place] [address]    Patient: Nicole Abraham   : 1955  Diagnosis/ICD-10 Code:  Closed fracture of distal end of right radius, unspecified fracture morphology, initial encounter [S52.501A]  Referring practitioner: Roderick Cummings MD  Date of Initial Visit: Type: THERAPY  Noted: 2024  Today's Date: 2025  Patient seen for 21 sessions      Subjective:   Subjective Questionnaire: QuickDASH:   Clinical Progress: improved  Home Program Compliance: Yes  Treatment has included: therapeutic exercise, manual therapy, and therapeutic activity    Subjective Evaluation    History of Present Illness    Subjective comment: \"I feel like I'm moving it a little better\"Pain  Current pain ratin       Objective          Observations     Additional Wrist/Hand Observation Details  Swelling visible in R digits/hand/wrist.    Neurological Testing     Additional Neurological Details  Pt reports no numbness/tingling.    Active Range of Motion     Right Elbow   Flexion: WFL  Extension: WFL  Forearm supination: 20 degrees   Forearm pronation: WFL    Left Wrist   Wrist flexion: 95 degrees   Wrist extension: 75 degrees   Radial deviation: 25 degrees   Ulnar deviation: 35 degrees     Right Wrist   Wrist flexion: 66 degrees   Wrist extension: 48 degrees with pain  Radial deviation: 5 degrees   Ulnar deviation: 12 degrees     Right Thumb   Opposition: Pt able to oppose to base of small finger.    Additional Active Range of Motion Details  Shifting continues with supination. Measurement is with wrist brace on. Pt supinates to 40 without brace.  3.5 cm at MF from DPC.    Strength/Myotome Testing     Left Wrist/Hand      (2nd hand position)     Trial 1: 25 lbs    Trial 2: 20 lbs    Trial 3: 19 lbs    Average: 21.33 lbs    Right Wrist/Hand   Normal wrist strength     (2nd hand position)     Trial 1: 5 lbs    Trial 2: 5 lbs    Trial 3: 5 lbs    Average: 5 " lbs    Swelling     Left Wrist/Hand   Thumb     Proximal: 6.25 cm    Distal: 6 cm  Index     Proximal: 6.25 cm    Middle: 5.5 cm    Distal: 5 cm  Middle     Proximal: 6 cm    Middle: 5.5 cm    Distal: 5 cm  Ring     Proximal: 5.5 cm    Middle: 4.5 cm    Distal: 4.5 cm  Little     Proximal: 5 cm    Middle: 4.5 cm    Distal: 4.5 cm  Circumference MCP: 17.5 cm  Circumference wrist: 15.25 cm    Right Wrist/Hand   Thumb     Proximal: 7 cm    Distal: 6 cm  Index     Proximal: 6.5 cm    Middle: 5.75 cm    Distal: 5 cm  Middle     Proximal: 6.75 cm    Middle: 5.75 cm    Distal: 5 cm  Ring     Proximal: 6.25 cm    Middle: 5 cm    Distal: 4.25 cm  Little     Proximal: 5.5 cm    Middle: 4.5 cm    Distal: 4 cm  Circumference MCP: 18 cm  Circumference wrist: 14.5 cm      Assessment & Plan       Assessment  Impairments: abnormal or restricted ROM, activity intolerance, impaired physical strength, lacks appropriate home exercise program and pain with function   Functional limitations: carrying objects, lifting, sleeping, pulling, pushing and uncomfortable because of pain   Assessment details: Pt displays improved forearm supination. QuickDASH score remains close to last progress note. Pt continues with decreased ROM and increased pain with fxl use that limits ability to complete ADLs/IADLs. OT recommends discharge after next visit. Pt met 2/4 STGs and partially met another STG and LTG.  Prognosis: good    Goals  Plan Goals: The patient complains of pain in the R wrist.  LTG 1  12 weeks:  The patient will report a pain rating of 1/10 at worst in order to improve sleep quality and tolerance to performance of activities of daily living.   Status: New  STG 1  8 weeks:  The patient will report a pain rating of 3/10 at worst.  Status:  New (5/10)    2.  The patient has limited ROM of the R wrist/hand.  LTG 2  12 weeks:  The patient will demonstrate 80 degrees R wrist flexion, 70 degrees extension, 30 degrees ulnar deviation, 20 degrees  radial deviation and ability to make composite fist to improve ability to carry and manipulate objects.  Status: New  STG 2  8 weeks:  The patient will demonstrate 60 degrees of R wrist flexion, 30 degrees wrist extension, 20 degrees ulnar deviation, 10 degrees radial deviation, and 3 cm from DPC.  Status:  Partially met    3.  The patient has limited strength of the R hand/wrist.  LTG 3  12 weeks:  The patient will demonstrate 25 lbs of  strength and 5/5 wrist strength in order to grasp and manipulate objects.  Status: Partially met  STG 3  8 weeks:  The patient will demonstrate ability to tolerate MMT and  strength testing of R hand/wrist.  Status:  Met    4.  Carrying, moving, and handling objects functional limitation.  LTG 4  12 weeks:  The patient will demonstrate 1-19 % limitation by achieving a score of 15/55 on the Quick DASH.  Status: New  STG 4  8 weeks:  The patient will demonstrate 20-39 % limitation by achieving a score of 27/55 on the Quick DASH.  Status:  Met       Plan  Planned modality interventions: cryotherapy and thermotherapy (hydrocollator packs)  Planned therapy interventions: body mechanics training, fine motor coordination training, flexibility, functional ROM exercises, home exercise program, joint mobilization, manual therapy, neuromuscular re-education, postural training, soft tissue mobilization, strengthening, stretching and therapeutic activities  Frequency: 2x week  Duration in weeks: 12  Treatment plan discussed with: patient      Progress toward previous goals: Partially Met      Recommendations: Continue as planned  Timeframe: 1 week  Prognosis to achieve goals: fair  Date of last progress/eval note: 1/7/25  OT SIGNATURE: Armand Lowry OT   DATE TREATMENT INITIATED: Type: THERAPY  Noted: 11/11/2024  [place] License: [id number]    Certification Period: 2/4/2025 - 5/4/2025        Based upon review of the patient's progress and continued therapy plan, it is my medical opinion that  Nicole Abraham should continue occupational therapy treatment at [facility]  [address] [facility] [address] [address] [phone].    Signature: __________________________________  Roderick Cummings MD MD NPI: [id number]  Timed:  Manual Therapy:    10     mins  48169;  Therapeutic Exercise:    18     mins  57825;     Therapeutic Activity:     10     mins  58287;       Timed Treatment:   38   mins   Total Treatment:     38   mins    Start time: 0953  End time: 1031    Please sign and return via fax to [phone]  . Thank you, [facility].

## 2025-02-06 DIAGNOSIS — M25.531 RIGHT WRIST PAIN: Primary | ICD-10-CM

## 2025-02-07 ENCOUNTER — TREATMENT (OUTPATIENT)
Dept: PHYSICAL THERAPY | Facility: CLINIC | Age: 70
End: 2025-02-07
Payer: MEDICARE

## 2025-02-07 DIAGNOSIS — M25.631 STIFFNESS OF RIGHT WRIST JOINT: ICD-10-CM

## 2025-02-07 DIAGNOSIS — S52.501A CLOSED FRACTURE OF DISTAL END OF RIGHT RADIUS, UNSPECIFIED FRACTURE MORPHOLOGY, INITIAL ENCOUNTER: Primary | ICD-10-CM

## 2025-02-07 DIAGNOSIS — M25.531 RIGHT WRIST PAIN: ICD-10-CM

## 2025-02-07 DIAGNOSIS — M25.641 STIFFNESS OF RIGHT HAND JOINT: ICD-10-CM

## 2025-02-07 NOTE — PROGRESS NOTES
Occupational Therapy Daily Treatment/discharge Note  [place] OT: [address] [place] [address]      Patient: Nicole Abraham   : 1955  Diagnosis/ICD-10 Code:  Closed fracture of distal end of right radius, unspecified fracture morphology, initial encounter [S52.501A]  Referring practitioner: Roderick Cummings MD  Date of Initial Visit: Type: THERAPY  Noted: 2024  Today's Date: 2025  Patient seen for 22 sessions           Subjective   Nicole Abraham reports: No current pain.    Objective          Observations     Additional Wrist/Hand Observation Details  Swelling visible in R digits/hand/wrist.    Neurological Testing     Additional Neurological Details  Pt reports no numbness/tingling.    Active Range of Motion     Right Elbow   Flexion: WFL  Extension: WFL  Forearm supination: 20 degrees   Forearm pronation: WFL    Left Wrist   Wrist flexion: 95 degrees   Wrist extension: 75 degrees   Radial deviation: 25 degrees   Ulnar deviation: 35 degrees     Right Wrist   Wrist flexion: 66 degrees   Wrist extension: 48 degrees with pain  Radial deviation: 5 degrees   Ulnar deviation: 12 degrees     Right Thumb   Opposition: Pt able to oppose to base of small finger.    Additional Active Range of Motion Details  Shifting continues with supination. Measurement is with wrist brace on. Pt supinates to 40 without brace.  3.5 cm at MF from DPC.    Strength/Myotome Testing     Left Wrist/Hand      (2nd hand position)     Trial 1: 25 lbs    Trial 2: 20 lbs    Trial 3: 19 lbs    Average: 21.33 lbs    Right Wrist/Hand   Normal wrist strength     (2nd hand position)     Trial 1: 5 lbs    Trial 2: 5 lbs    Trial 3: 5 lbs    Average: 5 lbs    Swelling     Left Wrist/Hand   Thumb     Proximal: 6.25 cm    Distal: 6 cm  Index     Proximal: 6.25 cm    Middle: 5.5 cm    Distal: 5 cm  Middle     Proximal: 6 cm    Middle: 5.5 cm    Distal: 5 cm  Ring     Proximal: 5.5 cm    Middle: 4.5 cm    Distal: 4.5 cm  Little      Proximal: 5 cm    Middle: 4.5 cm    Distal: 4.5 cm  Circumference MCP: 17.5 cm  Circumference wrist: 15.25 cm    Right Wrist/Hand   Thumb     Proximal: 7 cm    Distal: 6 cm  Index     Proximal: 6.5 cm    Middle: 5.75 cm    Distal: 5 cm  Middle     Proximal: 6.75 cm    Middle: 5.75 cm    Distal: 5 cm  Ring     Proximal: 6.25 cm    Middle: 5 cm    Distal: 4.25 cm  Little     Proximal: 5.5 cm    Middle: 4.5 cm    Distal: 4 cm  Circumference MCP: 18 cm  Circumference wrist: 14.5 cm        See Exercise, Manual, and Modality Logs for complete treatment.       Assessment & Plan       Assessment  Impairments: abnormal or restricted ROM, activity intolerance, impaired physical strength, lacks appropriate home exercise program and pain with function   Functional limitations: carrying objects, lifting, sleeping, pulling, pushing and uncomfortable because of pain   Assessment details: Pt displays improved forearm supination. QuickDASH score remains close to last progress note. Pt continues with decreased ROM and increased pain with fxl use that limits ability to complete ADLs/IADLs. OT recommends discharge at this time due to limited fxl progress. Pt met 2/4 STGs and partially met another STG and LTG.  Prognosis: good    Goals  Plan Goals: The patient complains of pain in the R wrist.  LTG 1  12 weeks:  The patient will report a pain rating of 1/10 at worst in order to improve sleep quality and tolerance to performance of activities of daily living.   Status: New  STG 1  8 weeks:  The patient will report a pain rating of 3/10 at worst.  Status:  New (5/10)    2.  The patient has limited ROM of the R wrist/hand.  LTG 2  12 weeks:  The patient will demonstrate 80 degrees R wrist flexion, 70 degrees extension, 30 degrees ulnar deviation, 20 degrees radial deviation and ability to make composite fist to improve ability to carry and manipulate objects.  Status: New  STG 2  8 weeks:  The patient will demonstrate 60 degrees of R wrist  flexion, 30 degrees wrist extension, 20 degrees ulnar deviation, 10 degrees radial deviation, and 3 cm from DPC.  Status:  Partially met    3.  The patient has limited strength of the R hand/wrist.  LTG 3  12 weeks:  The patient will demonstrate 25 lbs of  strength and 5/5 wrist strength in order to grasp and manipulate objects.  Status: Partially met  STG 3  8 weeks:  The patient will demonstrate ability to tolerate MMT and  strength testing of R hand/wrist.  Status:  Met    4.  Carrying, moving, and handling objects functional limitation.  LTG 4  12 weeks:  The patient will demonstrate 1-19 % limitation by achieving a score of 15/55 on the Quick DASH.  Status: New  STG 4  8 weeks:  The patient will demonstrate 20-39 % limitation by achieving a score of 27/55 on the Quick DASH.  Status:  Met       Plan  Planned modality interventions: cryotherapy and thermotherapy (hydrocollator packs)  Planned therapy interventions: body mechanics training, fine motor coordination training, flexibility, functional ROM exercises, home exercise program, joint mobilization, manual therapy, neuromuscular re-education, postural training, soft tissue mobilization, strengthening, stretching and therapeutic activities  Frequency: 2x week  Duration in weeks: 12  Treatment plan discussed with: patient         Timed:  Manual Therapy:    10     mins  55589;  Therapeutic Exercise:    10     mins  29967;     Therapeutic Activity:     9     mins  18705;       Timed Treatment:   29   mins   Total Treatment:     30   mins    Start time: 1000  End time: 1030    Armand Lowry OT  Occupational Therapist  [place] License: [id number]  NPI: [id number]

## 2025-02-12 ENCOUNTER — OFFICE VISIT (OUTPATIENT)
Dept: ORTHOPEDIC SURGERY | Facility: CLINIC | Age: 70
End: 2025-02-12
Payer: MEDICARE

## 2025-02-12 VITALS — HEART RATE: 96 BPM | SYSTOLIC BLOOD PRESSURE: 168 MMHG | OXYGEN SATURATION: 96 % | DIASTOLIC BLOOD PRESSURE: 72 MMHG

## 2025-02-12 DIAGNOSIS — M25.331 DRUJ (DISTAL RADIOULNAR JOINT) INSTABILITY, POST-TRAUMATIC, RIGHT: Primary | ICD-10-CM

## 2025-02-12 DIAGNOSIS — M25.531 RIGHT WRIST PAIN: ICD-10-CM

## 2025-02-12 DIAGNOSIS — S52.501D CLOSED FRACTURE OF DISTAL END OF RIGHT RADIUS WITH ROUTINE HEALING, UNSPECIFIED FRACTURE MORPHOLOGY, SUBSEQUENT ENCOUNTER: ICD-10-CM

## 2025-02-12 NOTE — PROGRESS NOTES
Chief Complaint  Follow-up of the Right Wrist    Subjective          Nicole Abraham presents to [facility]   History of Present Illness    Nicole Abraham presents today for a follow-up of her right wrist.  Patient has a right distal radius fracture that we have treated conservatively with initial injury 9/15/2024.  Today, patient states that she is doing okay.  She has continue work with formal physical therapy until Friday.  She states that she has no pain to her wrist but is concerned with the shifting of her DRUJ.  She still experiences some swelling to the fingers and unable to get her fingers to the palm.  She is unable to  things due to her finger range of motion.  She has been taking meloxicam with some noticeable relief.      No Known Allergies     Social History     Socioeconomic History    Marital status:    Tobacco Use    Smoking status: Never     Passive exposure: Never    Smokeless tobacco: Never   Vaping Use    Vaping status: Never Used   Substance and Sexual Activity    Alcohol use: Not Currently        I reviewed the patient's chief complaint, history of present illness, review of systems, past medical history, surgical history, family history, social history, medications, and allergy list.     REVIEW OF SYSTEMS    Constitutional: Denies fevers, chills, weight loss  Cardiovascular: Denies chest pain, shortness of breath  Skin: Denies rashes, acute skin changes  Neurologic: Denies headache, loss of consciousness  MSK: Right wrist pain      Objective   Vital Signs:   /72   Pulse 96   SpO2 96%     There is no height or weight on file to calculate BMI.    Physical Exam    General: Alert. No acute distress.   Right upper extremity: Forearm soft. Demonstrates active wrist flexion and extension with associated stiffness. Wrist extension 5. Wrist flexion 30. Mild swelling to the fingers. Stiffness with finger flexion, unable to get fingers to the palm. Full  finger extension.  DRUJ shift with forearm rotation.  Thumb opposition intact. Palmar abduction of thumb intact. Sensation intact to the median, radial, and ulnar nerve distributions. Palpable radial pulse.     Procedures    Imaging Results (Most Recent)       Procedure Component Value Units Date/Time    XR Wrist 2 View Right [[id number]] Resulted: 02/12/25 1515     Updated: 02/12/25 1515    Narrative:      Indications: Follow-up right distal radius fracture    Views: AP and lateral right wrist    Findings: Well-healed right distal radius fracture is stable.  Arthritic   changes are seen to the hand and wrist.    Comparative Data: Comparative data found and reviewed today.                   Assessment and Plan    Diagnoses and all orders for this visit:    1. DRUJ (distal radioulnar joint) instability, post-traumatic, right (Primary)  -     Ambulatory Referral to Hand Surgery    2. Right wrist pain  -     XR Wrist 2 View Right    3. Closed fracture of distal end of right radius with routine healing, unspecified fracture morphology, subsequent encounter  -     Ambulatory Referral to Hand Surgery        Nicole Abraham presents today to follow-up with a right distal radius fracture that we have been treating conservatively with initial injury 9/15/2024.  X-rays reviewed with the patient today.  A referral was placed today for hand surgery for further evaluation and treatment options.      Patient will follow up with our office as needed.      Call or return if symptoms worsen or patient has any concerns.       Follow Up   Return if symptoms worsen or fail to improve.  Patient was given instructions and counseling regarding her condition or for health maintenance advice. Please see specific information pulled into the AVS if appropriate.     Samina Taveras PA-C  02/13/25  09:32 EST

## 2025-02-15 DIAGNOSIS — S52.501D CLOSED FRACTURE OF DISTAL END OF RIGHT RADIUS WITH ROUTINE HEALING, UNSPECIFIED FRACTURE MORPHOLOGY, SUBSEQUENT ENCOUNTER: ICD-10-CM

## 2025-02-17 RX ORDER — MELOXICAM 7.5 MG/1
7.5 TABLET ORAL DAILY
Qty: 30 TABLET | Refills: 0 | Status: SHIPPED | OUTPATIENT
Start: 2025-02-17

## 2025-03-19 DIAGNOSIS — S52.501D CLOSED FRACTURE OF DISTAL END OF RIGHT RADIUS WITH ROUTINE HEALING, UNSPECIFIED FRACTURE MORPHOLOGY, SUBSEQUENT ENCOUNTER: ICD-10-CM

## 2025-03-20 RX ORDER — MELOXICAM 7.5 MG/1
7.5 TABLET ORAL DAILY
Qty: 30 TABLET | Refills: 0 | Status: SHIPPED | OUTPATIENT
Start: 2025-03-20

## 2025-04-15 ENCOUNTER — TRANSCRIBE ORDERS (OUTPATIENT)
Dept: PHYSICAL THERAPY | Facility: CLINIC | Age: 70
End: 2025-04-15
Payer: MEDICARE

## 2025-04-15 DIAGNOSIS — M25.531 PAIN IN RIGHT WRIST: Primary | ICD-10-CM

## 2025-04-15 DIAGNOSIS — S52.551G: ICD-10-CM

## 2025-04-20 DIAGNOSIS — S52.501D CLOSED FRACTURE OF DISTAL END OF RIGHT RADIUS WITH ROUTINE HEALING, UNSPECIFIED FRACTURE MORPHOLOGY, SUBSEQUENT ENCOUNTER: ICD-10-CM

## 2025-04-21 RX ORDER — MELOXICAM 7.5 MG/1
7.5 TABLET ORAL DAILY
Qty: 30 TABLET | Refills: 0 | Status: SHIPPED | OUTPATIENT
Start: 2025-04-21

## 2025-05-02 ENCOUNTER — TREATMENT (OUTPATIENT)
Dept: PHYSICAL THERAPY | Facility: CLINIC | Age: 70
End: 2025-05-02
Payer: MEDICARE

## 2025-05-02 DIAGNOSIS — M25.531 RIGHT WRIST PAIN: ICD-10-CM

## 2025-05-02 DIAGNOSIS — S52.511G: Primary | ICD-10-CM

## 2025-05-02 NOTE — PROGRESS NOTES
Outpatient Occupational Therapy Ortho Initial Evaluation  [address] [place] [id number]    Patient: Nicole Abraham   : 1955  Referring practitioner: Torres Leyva, *  Date of Initial Visit: 2025  Today's Date: 2025.   Patient seen for 1 sessions  Diagnosis/ICD-10 Code:      ICD-10-CM ICD-9-CM   1. Closed displaced fracture of styloid process of right radius with delayed healing  S52.511G V54.12   2. Right wrist pain  M25.531 719.43                Subjective Questionnaire: QuickDASH: 24      Subjective Evaluation    History of Present Illness  Date of onset: 2024  Mechanism of injury: 23 Broke  R wrist after tripping on a drainage pipe in the yard. Casted and had therapy.   Resulted in limited gripping and supination. Having difficulty brush teeth, and hair, hold a fork.  Washing hair is difficult      Patient Occupation: retired - needlework, gardening Quality of life: excellent    Pain  Current pain ratin  At worst pain rating: 3 (with stretching)  Location: forearm and wrist R side  Relieving factors: medications (meloxicam 1x/day)  Aggravating factors: sleeping (driving, washing hair)  Symptom course: after therapy, unable to get full rotation.    Social Support  Lives in: multiple-level home  Lives with: spouse    Hand dominance: right    Diagnostic Tests  X-ray: abnormal    Treatments  Treatments tried: occupational therapy.  Patient Goals  Patient goals for therapy: decreased edema, decreased pain, increased motion, increased strength, return to sport/leisure activities and independence with ADLs/IADLs           Past Medical hx: cancer cervical in      Objective          Neurological Testing     Sensation     Wrist/Hand     Right   Intact: light touch    Comments   Right light touch: 2.83 SW    Active Range of Motion     Right Elbow   Flexion: 125 degrees   Extension: 10 degrees   Forearm supination: 20 degrees   Forearm pronation: 90 degrees     Right Wrist    Wrist flexion: 60 degrees   Wrist extension: 50 degrees     Additional Active Range of Motion Details  R hand  0-45     0-50  0-80 25-75 0-40  0-80 25-85 0-60  0-80 25-90 0-70  0-75 25-90 0-60       Strength/Myotome Testing     Left Wrist/Hand      (2nd hand position)   Left  strength (2nd hand position) 50 lbs    Right Wrist/Hand      (2nd hand position)   Right  strength (2nd hand position) 20 lbs          Assessment & Plan       Assessment  Impairments: abnormal coordination, abnormal muscle firing, abnormal or restricted ROM, activity intolerance, impaired physical strength, lacks appropriate home exercise program, pain with function, safety issue and weight-bearing intolerance   Functional limitations: carrying objects, lifting, pulling, pushing, reaching behind back, reaching overhead and unable to perform repetitive tasks   Assessment details: Pt presents with R forearm and digital stiffness s/p R wrist fracture last fall.  Pt reports she is having difficulty washing her hair, lifting, gripping, gardening, driving.  Prognosis: good    Goals  Plan Goals: 1. The patient complains of pain in the R wrist                  LTG 1: 12 weeks:  The patient will report a pain rating of 0/10 or better in order to improve sleep quality and tolerance to performance of activities of daily living.                                  STATUS:  New                  STG 1a: 4 weeks:  The patient will report a pain rating of 2/10 or better.                                   STATUS:  New  2. The patient has limited ROM of the R wrist and forearm,                  LTG 2: 12 weeks:  The patient will demonstrate 140 degrees of DEJESUS to allow the patient to weight bear to get out of bed.                                  STATUS:  New                   STG 2a: 4 weeks:  The patient will demonstrate 120 degrees of R wrist DEJESUS.                                  STATUS:  New                                        LTG 2: 12  weeks:  The patient will demonstrate 60 degrees of DEJESUS of sup to allow the patient to drive.                                  STATUS:  New                   STG 2a: 4 weeks:  The patient will demonstrate 30 degrees of R forearm supination.                                  STATUS:  New                               LTG 2: 12 weeks:  The patient will demonstrate 240 degrees of DEJESUS of digits to allow the patient to sew.                                  STATUS:  New                   STG 2a: 4 weeks:  The patient will demonstrate 220 degrees of R digit DEJESUS.                                  STATUS:  New            3. The patient has limited strength of the R UE.                  LTG 3: 12 weeks:  The patient will demonstrate 45# in order to return to household tasks.                                  STATUS:  New                  STG 3a: 4 weeks:  The patient will demonstrate tolerance to light strengthening without adverse reaction.                                  STATUS:  New  4. Carrying, Moving, and Handling Objects Functional Limitation                                    LTG 4: 12 weeks:  The patient will demonstrate 0% limitation by achieving a score of 11 on the Quick DASH.                                  STATUS:  New                  STG 4a: 4 weeks:  The patient will demonstrate 1-19% limitation by achieving a score of 15 on the Quick DASH.                                    STATUS:  New                    TREATMENT: Orthotic fabrication/fitting/management and training, Manual therapy, therapeutic exercise, home exercise instruction, and modalities as needed to include: electrical stimulation, ultrasound, moist heat, paraffin, fluidotherapy, dry needling, and ice.       Plan  Planned modality interventions: TENS, thermotherapy (hydrocollator packs), thermotherapy (paraffin bath), ultrasound and electrical stimulation/Russian stimulation  Other planned modality interventions: fluidotherapy, dry needling  Planned  therapy interventions: manual therapy, ADL retraining, motor coordination training, neuromuscular re-education, soft tissue mobilization, fine motor coordination training, body mechanics training, balance/weight-bearing training, functional ROM exercises, flexibility, spinal/joint mobilization, strengthening, stretching, therapeutic activities, IADL retraining, joint mobilization and home exercise program  Frequency: 2x week  Duration in weeks: 12  Treatment plan discussed with: patient        Patient is indicated for skilled occupational therapy services.    History # of Personal Factors and/or Comorbidities: MODERATE (1-2)  Examination of Body System(s): # of elements: MODERATE (3)  Clinical Presentation: EVOLVING  Clinical Decision Making: MODERATE     Evaluation:  Low Complexity:    0     mins  78072;  Mod Complexity:    20     mins  85729;  High Complexity:    0     mins  48844;    Timed:  Manual Therapy:    20     mins  51889;  Therapeutic Exercise:    10     mins  55092;  Therapeutic Activity:    0     mins  33796;     Neuromuscular Carl:    10    mins  45503;    Ultrasound:     0     mins  74732;    Electrical Stimulation:    0     mins  14879;    Untimed:  Electrical Stimulation:    0     mins  30515 ( );  Fluidotherapy:        0    mins  71720  Dry Needlin    mins  74059    Timed Treatment:   40   mins   Total Treatment:     60   mins      OT SIGNATURE: MOODY Gerard, OTR/L, CHT     Electronically signed    [place] LICENSE: [id number]   DATE TREATMENT INITIATED: 2025    Initial Certification  Certification Period: 2025 thru 2025  I certify that the therapy services are furnished while this patient is under my care.  The services outlined above are required by this patient, and will be reviewed every 90 days.     Signature:______________________________________________ PHYSICIAN:  Torres Conner MD   NPI: [id number]                                       DATE:    Please sign and return via fax to [phone]   Thank you, [facility].

## 2025-05-07 ENCOUNTER — TREATMENT (OUTPATIENT)
Dept: PHYSICAL THERAPY | Facility: CLINIC | Age: 70
End: 2025-05-07
Payer: MEDICARE

## 2025-05-07 DIAGNOSIS — S52.511G: Primary | ICD-10-CM

## 2025-05-07 DIAGNOSIS — M25.531 RIGHT WRIST PAIN: ICD-10-CM

## 2025-05-07 NOTE — PROGRESS NOTES
Occupational Therapy Daily Treatment Note   [address] [place] [address]    Patient: Nicole Abraham   : 1955  Referring practitioner: Torres Leyva, *  Date of Initial Visit: Type: THERAPY  Noted: 2025  Today's Date: 2025.   Patient seen for 2 sessions    ICD-10-CM ICD-9-CM   1. Closed displaced fracture of styloid process of right radius with delayed healing  S52.511G V54.12   2. Right wrist pain  M25.531 719.43          Nicole Abraham reports I am doing fairly well.  I was a little sore after last time, but not bad.       Functional status I have been planting in my garden     Objective   See Exercise, Manual, and Modality Logs for complete treatment.   Supination 35 degrees    Assessment/Plan  Cont to progress AROM and PROM as tolerated. Gained 15 degrees Supination since eval       Cont per POC           Timed:  Manual Therapy:    20     mins  88155;  Therapeutic Exercise:    10     mins  89456;  Therapeutic Activity:    0     mins  27630;     Neuromuscular Carl:    8    mins  71881;    Ultrasound:     0     mins  33481;    Electrical Stimulation:    00     mins  87023;    Untimed:  Electrical Stimulation:    0     mins  77427 ( );  Fluidotherapy:        0    mins  88903  Dry Needlin    mins  48111    Timed Treatment:   38   mins   Total Treatment:     38   mins    OT SIGNATURE: MOODY Gerard, OTR/L, CHT     Electronically signed    [place] LICENSE: [id number]

## 2025-05-08 ENCOUNTER — TREATMENT (OUTPATIENT)
Dept: PHYSICAL THERAPY | Facility: CLINIC | Age: 70
End: 2025-05-08
Payer: MEDICARE

## 2025-05-08 DIAGNOSIS — S52.511G: Primary | ICD-10-CM

## 2025-05-08 DIAGNOSIS — M25.531 RIGHT WRIST PAIN: ICD-10-CM

## 2025-05-08 NOTE — PROGRESS NOTES
Occupational Therapy Daily Treatment Note   [address] [place] [address]    Patient: Nicole Abraham   : 1955  Referring practitioner: Torres Leyva, *  Date of Initial Visit: Type: THERAPY  Noted: 2025  Today's Date: 2025.   Patient seen for 3 sessions    ICD-10-CM ICD-9-CM   1. Closed displaced fracture of styloid process of right radius with delayed healing  S52.511G V54.12   2. Right wrist pain  M25.531 719.43          Nicole Abraham reports I am having popping/shifting with the rotation still.  It is not painful, but I do notice it.       Functional status progressing with supination position this date.     Objective   See Exercise, Manual, and Modality Logs for complete treatment.   Kinesiotape applied ulnar mid forearm with 50% pull to support ulnar head from shifting with Rotation    Assessment/Plan    Supination walking in this date 25 degrees, after treatment about 45 degrees.  Ulnar shifting was still occurring with tape on.  Will continue to assess at next visit.     Cont per POC           Timed:  Manual Therapy:    20     mins  55185;  Therapeutic Exercise:    10     mins  74762;  Therapeutic Activity:    0     mins  01197;     Neuromuscular Carl:    0    mins  20083;    Ultrasound:     0     mins  57515;    Electrical Stimulation:    0     mins  10651;    Untimed:  Electrical Stimulation:    0     mins  11358 ( );  Fluidotherapy:        0    mins  61684  Dry Needlin    mins      Timed Treatment:   30   mins   Total Treatment:     30   mins    OT SIGNATURE: MOODY Gerard, OTR/L, CHT     Electronically signed    [place] LICENSE: [id number]

## 2025-05-12 ENCOUNTER — TREATMENT (OUTPATIENT)
Dept: PHYSICAL THERAPY | Facility: CLINIC | Age: 70
End: 2025-05-12
Payer: MEDICARE

## 2025-05-12 DIAGNOSIS — M25.531 RIGHT WRIST PAIN: ICD-10-CM

## 2025-05-12 DIAGNOSIS — S52.511G: Primary | ICD-10-CM

## 2025-05-12 PROCEDURE — 97110 THERAPEUTIC EXERCISES: CPT | Performed by: OCCUPATIONAL THERAPIST

## 2025-05-12 PROCEDURE — 97112 NEUROMUSCULAR REEDUCATION: CPT | Performed by: OCCUPATIONAL THERAPIST

## 2025-05-12 NOTE — PROGRESS NOTES
Occupational Therapy Daily Treatment Note   [address] [place] [address]    Patient: Nicole Abraham   : 1955  Referring practitioner: Torres Leyva, *  Date of Initial Visit: Type: THERAPY  Noted: 2025  Today's Date: 2025.   Patient seen for 4 sessions    ICD-10-CM ICD-9-CM   1. Closed displaced fracture of styloid process of right radius with delayed healing  S52.511G V54.12   2. Right wrist pain  M25.531 719.43          Nicole Abraham reports I kept the tape on until last night.  It helped stabilize the wrist.       Functional status planted the garden this weekend    Objective   See Exercise, Manual, and Modality Logs for complete treatment.   Right Elbow   Flexion: 150 degrees   Extension: 5 degrees   Forearm supination: 40 degrees   Forearm pronation: 90 degrees      Right Wrist   Wrist flexion: 60 degrees   Wrist extension: 60 degrees     Assessment/Plan  Progressing supination by 20 degrees, and wrist extension 10 degrees.     Cont per POC           Timed:  Manual Therapy:    10     mins  38522;  Therapeutic Exercise:    10     mins  96919;  Therapeutic Activity:    0     mins  95919;     Neuromuscular Carl:    10    mins  20162;    Ultrasound:     0     mins  75719;    Electrical Stimulation:    0     mins  60877;    Untimed:  Electrical Stimulation:    0     mins  77696 ( );  Fluidotherapy:        0    mins  58706  Dry Needlin    mins  71419    Timed Treatment:   30   mins   Total Treatment:     30   mins    OT SIGNATURE: MOODY Gerard, FAREEDR/L, CHT     Electronically signed    [place] LICENSE: [id number]

## 2025-05-14 ENCOUNTER — TREATMENT (OUTPATIENT)
Dept: PHYSICAL THERAPY | Facility: CLINIC | Age: 70
End: 2025-05-14
Payer: MEDICARE

## 2025-05-14 DIAGNOSIS — S52.511G: Primary | ICD-10-CM

## 2025-05-14 DIAGNOSIS — M25.531 RIGHT WRIST PAIN: ICD-10-CM

## 2025-05-14 NOTE — LETTER
Progress Note  5/14/2025  Torres Conner MD    Re: iNcole Abraham  ________________________________________________________________    Mrs. Nicole Abraham, has attended 5 OT sessions.  Treatment has consisted of: AROM, PROM, joint mobs, kinesiotaping for support.     S: Mrs. Nicole Abraham states: My wrist clunks when I turn it into supination, the tape helps some with that but hurts the skin when I take it off.     O:   Supination 50 degrees  Pronation 90 degrees  Wrist flexion 60 degrees  Wrist ext 60 degrees    #20    A:  Pt is feeling clunking at the ulnar wrist when rotating into supination.  She has previously tried the bullseye brace which helped but didn't stop the shifting.  Using the kinesiotape helps to reduce the clunking and is the most supportive option. Do you think a wrist restore brace would be of benefit to help reduce the ulnar shift and allow increased functional use?    P: Please advise after your exam.    Thank you for this referral.

## 2025-05-14 NOTE — PROGRESS NOTES
Occupational Therapy Daily Treatment Note   [address] [place] [id number]    Patient: Nicole Abraham   : 1955  Referring practitioner: Torres Leyva, *  Date of Initial Visit: Type: THERAPY  Noted: 2025  Today's Date: 2025.   Patient seen for 5 sessions    ICD-10-CM ICD-9-CM   1. Closed displaced fracture of styloid process of right radius with delayed healing  S52.511G V54.12   2. Right wrist pain  M25.531 719.43          Nicole Abraham reports I am not feeling great today, my stomach is bothering me.       Functional status    Objective   See Exercise, Manual, and Modality Logs for complete treatment.   Supination 50 degrees  Pronation 90 degrees  Wrist flexion 60 degrees  Wrist ext 60 degrees    Assessment/Plan  Discussed using a wrist restore vs kinesiotape.      Cont per POC           Timed:  Manual Therapy:    20     mins  82439;  Therapeutic Exercise:    10     mins  31863;  Therapeutic Activity:    0     mins  60632;     Neuromuscular Carl:    0    mins  88739;    Ultrasound:     0     mins  62483;    Electrical Stimulation:    0     mins  12909;    Untimed:  Electrical Stimulation:    0     mins  72623 ( );  Fluidotherapy:        0    mins  24564  Dry Needlin    mins  66872    Timed Treatment:   30   mins   Total Treatment:     30   mins    OT SIGNATURE: MOODY Gerard, OTR/L, CHT     Electronically signed    [place] LICENSE: [id number]

## 2025-05-19 DIAGNOSIS — S52.501D CLOSED FRACTURE OF DISTAL END OF RIGHT RADIUS WITH ROUTINE HEALING, UNSPECIFIED FRACTURE MORPHOLOGY, SUBSEQUENT ENCOUNTER: ICD-10-CM

## 2025-05-20 RX ORDER — MELOXICAM 7.5 MG/1
7.5 TABLET ORAL DAILY
Qty: 30 TABLET | Refills: 0 | Status: SHIPPED | OUTPATIENT
Start: 2025-05-20

## 2025-05-21 ENCOUNTER — TREATMENT (OUTPATIENT)
Dept: PHYSICAL THERAPY | Facility: CLINIC | Age: 70
End: 2025-05-21
Payer: MEDICARE

## 2025-05-21 DIAGNOSIS — M25.531 RIGHT WRIST PAIN: ICD-10-CM

## 2025-05-21 DIAGNOSIS — S52.511G: Primary | ICD-10-CM

## 2025-05-21 NOTE — PROGRESS NOTES
Occupational Therapy Daily Treatment Note   [address] [place] [address]    Patient: Nicole Abraham   : 1955  Referring practitioner: Torres Leyva, *  Date of Initial Visit: Type: THERAPY  Noted: 2025  Today's Date: 2025.   Patient seen for 6 sessions    ICD-10-CM ICD-9-CM   1. Closed displaced fracture of styloid process of right radius with delayed healing  S52.511G V54.12   2. Right wrist pain  M25.531 719.43          Nicole Abraham reports the doctor said unless it was painful, no bracing.  I returned the supination brace.       Functional status pt progressing with R wrist for functional use.    Objective   See Exercise, Manual, and Modality Logs for complete treatment.   Added stabilization exercises in R wrist.  55 degrees supination after stretching.    Assessment/Plan  Cont to progress AROM and supination strengthening.       Cont per POC           Timed:  Manual Therapy:    10     mins  05350;  Therapeutic Exercise:    10     mins  73781;  Therapeutic Activity:    0     mins  74234;     Neuromuscular Carl:    10    mins  53182;    Ultrasound:     0     mins  64915;    Electrical Stimulation:    0     mins  65814;    Untimed:  Electrical Stimulation:    0     mins  58834 ( );  Fluidotherapy:        0    mins  81525  Dry Needlin    mins  39785    Timed Treatment:   30   mins   Total Treatment:     30   mins    OT SIGNATURE: MOODY Gerard, OTR/L, CHT     Electronically signed    [place] LICENSE: [id number]

## 2025-05-23 ENCOUNTER — TREATMENT (OUTPATIENT)
Dept: PHYSICAL THERAPY | Facility: CLINIC | Age: 70
End: 2025-05-23
Payer: MEDICARE

## 2025-05-23 DIAGNOSIS — S52.511G: Primary | ICD-10-CM

## 2025-05-23 DIAGNOSIS — M25.531 RIGHT WRIST PAIN: ICD-10-CM

## 2025-05-23 NOTE — PROGRESS NOTES
Occupational Therapy Daily Treatment Note   [address] [place] [address]    Patient: Nicole Abraham   : 1955  Referring practitioner: Torres Leyva, *  Date of Initial Visit: Type: THERAPY  Noted: 2025  Today's Date: 2025.   Patient seen for 7 sessions    ICD-10-CM ICD-9-CM   1. Closed displaced fracture of styloid process of right radius with delayed healing  S52.511G V54.12   2. Right wrist pain  M25.531 719.43          Nicole Abraham reports I am a little sore.       Functional status doing gardening better with R hand.     Objective   See Exercise, Manual, and Modality Logs for complete treatment.   Progress supination strengthening with R wrist.   Supination 55 degrees AROM    Assessment/Plan  Discussed HEP, progressing with rotation and stretching.  Has gained 35 degrees of supination       Cont per POC           Timed:  Manual Therapy:    10     mins  18384;  Therapeutic Exercise:    10     mins  36765;  Therapeutic Activity:    0     mins  01308;     Neuromuscular Carl:    10    mins  16997;    Ultrasound:     0     mins  97365;    Electrical Stimulation:    0     mins  10504;    Untimed:  Electrical Stimulation:    0     mins  70253 ( );  Fluidotherapy:        0    mins  88743  Dry Needlin    mins  69245    Timed Treatment:   30   mins   Total Treatment:     30   mins    OT SIGNATURE: MOODY Gerard, OTR/L, CHT     Electronically signed    [place] LICENSE: [id number]

## 2025-05-27 ENCOUNTER — TREATMENT (OUTPATIENT)
Dept: PHYSICAL THERAPY | Facility: CLINIC | Age: 70
End: 2025-05-27
Payer: MEDICARE

## 2025-05-27 DIAGNOSIS — M25.531 RIGHT WRIST PAIN: ICD-10-CM

## 2025-05-27 DIAGNOSIS — S52.511G: Primary | ICD-10-CM

## 2025-05-27 PROCEDURE — 97110 THERAPEUTIC EXERCISES: CPT | Performed by: OCCUPATIONAL THERAPIST

## 2025-05-27 PROCEDURE — 97112 NEUROMUSCULAR REEDUCATION: CPT | Performed by: OCCUPATIONAL THERAPIST

## 2025-05-27 NOTE — PROGRESS NOTES
Occupational Therapy Daily Treatment Note   [address] [place] [address]    Patient: Nicole Abraham   : 1955  Referring practitioner: Torres Leyva, *  Date of Initial Visit: Type: THERAPY  Noted: 2025  Today's Date: 2025.   Patient seen for 8 sessions    ICD-10-CM ICD-9-CM   1. Closed displaced fracture of styloid process of right radius with delayed healing  S52.511G V54.12   2. Right wrist pain  M25.531 719.43          Nicole Abraham reports I am doing much more      Functional status did crafts for vacation [employer]    Objective   See Exercise, Manual, and Modality Logs for complete treatment.   Supination 50 degrees AROM  Educated patient how to kady wrist restore brace for support with Ulnar clunking.  Pt to don with rubber at volar distal wrist pulling radial side first then ulnar side.     Assessment/Plan  Continue to progress strengthening of R wrist.      Cont per POC           Timed:  Manual Therapy:    0     mins  37906;  Therapeutic Exercise:    10     mins  50454;  Therapeutic Activity:    10     mins  16634;     Neuromuscular Cral:    10    mins  41385;    Ultrasound:     0     mins  69956;    Electrical Stimulation:    0     mins  13512;    Untimed:  Electrical Stimulation:    0     mins  71370 ( );  Fluidotherapy:        0    mins  88777  Dry Needlin    mins  92356    Timed Treatment:   30   mins   Total Treatment:     30   mins    OT SIGNATURE: MOODY Gerard, FAREEDR/L, CHT     Electronically signed    [place] LICENSE: [id number]

## 2025-05-29 ENCOUNTER — TREATMENT (OUTPATIENT)
Dept: PHYSICAL THERAPY | Facility: CLINIC | Age: 70
End: 2025-05-29
Payer: MEDICARE

## 2025-05-29 DIAGNOSIS — S52.511G: Primary | ICD-10-CM

## 2025-05-29 DIAGNOSIS — M25.531 RIGHT WRIST PAIN: ICD-10-CM

## 2025-05-29 NOTE — PROGRESS NOTES
Occupational Therapy Daily Treatment Note   [address] [place] [address]    Patient: Nicole Abraham   : 1955  Referring practitioner: Torres Leyva, *  Date of Initial Visit: Type: THERAPY  Noted: 2025  Today's Date: 2025.   Patient seen for 9 sessions    ICD-10-CM ICD-9-CM   1. Closed displaced fracture of styloid process of right radius with delayed healing  S52.511G V54.12   2. Right wrist pain  M25.531 719.43          Nicole Abraham reports I am feeling better. No pain today.      Functional status was able to work outside yesterday    Objective   See Exercise, Manual, and Modality Logs for complete treatment.   Used wrist restore with functional strengthening. Pt tolerated treatment well this date and strength is improving.     Assessment/Plan  Cont skilled OT for AROM, PROM, strengthening.      Cont per POC           Timed:  Manual Therapy:    10     mins  69199;  Therapeutic Exercise:    10     mins  52670;  Therapeutic Activity:    10     mins  75057;     Neuromuscular Carl:    10    mins  41328;    Ultrasound:     0     mins  26043;    Electrical Stimulation:    0     mins  90553;    Untimed:  Electrical Stimulation:    0     mins  57919 ( );  Fluidotherapy:        0    mins  91164  Dry Needlin    mins  16094    Timed Treatment:   40   mins   Total Treatment:     40   mins    OT SIGNATURE: MOODY Gerard, OTR/L, CHT     Electronically signed    [place] LICENSE: [id number]

## 2025-06-03 ENCOUNTER — TREATMENT (OUTPATIENT)
Dept: PHYSICAL THERAPY | Facility: CLINIC | Age: 70
End: 2025-06-03
Payer: MEDICARE

## 2025-06-03 DIAGNOSIS — S52.511G: Primary | ICD-10-CM

## 2025-06-03 DIAGNOSIS — M25.531 RIGHT WRIST PAIN: ICD-10-CM

## 2025-06-03 PROCEDURE — 97110 THERAPEUTIC EXERCISES: CPT | Performed by: OCCUPATIONAL THERAPIST

## 2025-06-03 PROCEDURE — 97112 NEUROMUSCULAR REEDUCATION: CPT | Performed by: OCCUPATIONAL THERAPIST

## 2025-06-03 NOTE — PROGRESS NOTES
Re-Assessment / Re-Certification  Occupational Therapy  [address] [place] [address]      Patient: Nicole Abraham   : 1955  Diagnosis/ICD-10 Code:  Closed displaced fracture of styloid process of right radius with delayed healing [S52.511G]  Referring practitioner: Torres Leyva, *  Date of Initial Visit: Type: THERAPY  Noted: 2025  Today's Date: 6/3/2025.   Patient seen for 10 sessions      Subjective:   Nicole Abraham reports: I am doing so much more with my arm.  I walked my dog yesterday  Subjective Questionnaire: QuickDASH: 15  Clinical Progress: improved  Home Program Compliance: Yes  Treatment has included: therapeutic exercise, neuromuscular re-education, manual therapy, and therapeutic activity    Subjective   Objective   Active Range of Motion      Right Elbow   Flexion: 155 degrees   Extension: 5 degrees   Forearm supination: 60 degrees   Forearm pronation: 90 degrees      Right Wrist   Wrist flexion: 70 degrees   Wrist extension: 65 degrees      Additional Active Range of Motion Details  R hand  0-45     0-50  0-80 20-90 0-40  0-90 20-90 0-60  0-90  0-75  0-80  0-60    Strength/Myotome Testing      Left Wrist/Hand       (2nd hand position)   Left  strength (2nd hand position) 50 lbs     Right Wrist/Hand       (2nd hand position)   Right  strength (2nd hand position) 25 lbs     Assessment/Plan    Visit Diagnoses:    ICD-10-CM ICD-9-CM   1. Closed displaced fracture of styloid process of right radius with delayed healing  S52.511G V54.12   2. Right wrist pain  M25.531 719.43       Progress toward previous goals: Partially Met    Plan Goals: 1. The patient complains of pain in the R wrist                  LTG 1: 12 weeks:  The patient will report a pain rating of 0/10 or better in order to improve sleep quality and tolerance to performance of activities of daily living.                                  STATUS:  MET                  STG 1a: 4 weeks:   The patient will report a pain rating of 2/10 or better.                                   STATUS:  MET  2. The patient has limited ROM of the R wrist and forearm,                  LTG 2: 12 weeks:  The patient will demonstrate 140 degrees of DEJESUS to allow the patient to weight bear to get out of bed.                                  STATUS:  NOT MET                   STG 2a: 4 weeks:  The patient will demonstrate 120 degrees of R wrist DEJESUS.                                  STATUS:  MET                                      LTG 2: 12 weeks:  The patient will demonstrate 60 degrees of DEJESUS of sup to allow the patient to drive.                                  STATUS:  MET                  STG 2a: 4 weeks:  The patient will demonstrate 30 degrees of R forearm supination.                                  STATUS:  MET                                         LTG 2: 12 weeks:  The patient will demonstrate 240 degrees of DEJESUS of digits to allow the patient to sew.                                  STATUS:  NOT MET                   STG 2a: 4 weeks:  The patient will demonstrate 220 degrees of R digit DEJESUS.                                  STATUS:  NOT MET             3. The patient has limited strength of the R UE.                  LTG 3: 12 weeks:  The patient will demonstrate 45# in order to return to household tasks.                                  STATUS:  NOT MET                  STG 3a: 4 weeks:  The patient will demonstrate tolerance to light strengthening without adverse reaction.                                  STATUS:  met  4. Carrying, Moving, and Handling Objects Functional Limitation                                    LTG 4: 12 weeks:  The patient will demonstrate 0% limitation by achieving a score of 11 on the Quick DASH.                                  STATUS:  NOT MET                  STG 4a: 4 weeks:  The patient will demonstrate 1-19% limitation by achieving a score of 15 on the Quick DASH.                                     STATUS:  MET      Recommendations: Continue as planned  Timeframe: 1 month  Prognosis to achieve goals: good      OT SIGNATURE: Izabela Edwards, OTD, OTR/L, CHT     Electronically signed    [place] LICENSE: [id number]   DATE TREATMENT INITIATED: 6/3/2025      Timed:  Manual Therapy:    0     mins  41788;  Therapeutic Exercise:    10     mins  30949;  Therapeutic Activity:    10     mins  11981;     Neuromuscular Carl:    10    mins  72618;    Ultrasound:     0     mins  00847;    Electrical Stimulation:    0     mins  82216;    Untimed:  Electrical Stimulation:    0     mins  53128 ( );  Fluidotherapy:        0    mins  55808  Dry Needlin    mins  18591    Timed Treatment:   30   mins   Total Treatment:     30   mins

## 2025-06-05 ENCOUNTER — TREATMENT (OUTPATIENT)
Dept: PHYSICAL THERAPY | Facility: CLINIC | Age: 70
End: 2025-06-05
Payer: MEDICARE

## 2025-06-05 DIAGNOSIS — S52.511G: Primary | ICD-10-CM

## 2025-06-05 DIAGNOSIS — M25.531 RIGHT WRIST PAIN: ICD-10-CM

## 2025-06-05 NOTE — PROGRESS NOTES
Occupational Therapy Daily Treatment Note   [address] [place] [address]    Patient: Nicole Abraham   : 1955  Referring practitioner: Torres Leyva, *  Date of Initial Visit: Type: THERAPY  Noted: 2025  Today's Date: 2025.   Patient seen for 11 sessions    ICD-10-CM ICD-9-CM   1. Closed displaced fracture of styloid process of right radius with delayed healing  S52.511G V54.12   2. Right wrist pain  M25.531 719.43          Nicole Abraham reports she is doing great.   Has returned to functional tasks at home.        Functional status pt doing household tasks without difficulty now.     Objective   See Exercise, Manual, and Modality Logs for complete treatment.   Active Range of Motion      Right Elbow   Flexion: 155 degrees   Extension: 5 degrees   Forearm supination: 60 degrees   Forearm pronation: 90 degrees      Right Wrist   Wrist flexion: 70 degrees   Wrist extension: 65 degrees      Additional Active Range of Motion Details  R hand  0-45     0-50  0-80 15-90    0-40  0-90 15-90    0-60  0-90   0-75  0-80       0-60     Strength/Myotome Testing      Left Wrist/Hand       (2nd hand position)   Left  strength (2nd hand position) 50 lbs     Right Wrist/Hand       (2nd hand position)   Right  strength (2nd hand position) 25 lbs        Assessment/Plan  Discussed transition to HEP.  Pt verbalized understanding and I with AROM and strengthening.  D/C from skilled OT to HEP           Timed:  Manual Therapy:    0     mins  73591;  Therapeutic Exercise:    15     mins  05080;  Therapeutic Activity:    0     mins  43545;     Neuromuscular Carl:    10    mins  03797;    Ultrasound:     0     mins  92198;    Electrical Stimulation:    0     mins  54325;    Untimed:  Electrical Stimulation:    0     mins  06546 ( );  Fluidotherapy:        0    mins  84569  Dry Needlin    mins      Timed Treatment:   25   mins   Total Treatment:     25    mins    OT SIGNATURE: Izabela Edwards, MOODY, OTR/L, CHT     Electronically signed    [place] LICENSE: [id number]